# Patient Record
Sex: MALE | Race: WHITE | Employment: OTHER | URBAN - METROPOLITAN AREA
[De-identification: names, ages, dates, MRNs, and addresses within clinical notes are randomized per-mention and may not be internally consistent; named-entity substitution may affect disease eponyms.]

---

## 2018-10-16 ENCOUNTER — OFFICE VISIT (OUTPATIENT)
Dept: FAMILY MEDICINE CLINIC | Facility: CLINIC | Age: 48
End: 2018-10-16
Payer: COMMERCIAL

## 2018-10-16 VITALS
TEMPERATURE: 98 F | RESPIRATION RATE: 16 BRPM | BODY MASS INDEX: 23.92 KG/M2 | WEIGHT: 176.63 LBS | SYSTOLIC BLOOD PRESSURE: 122 MMHG | HEART RATE: 60 BPM | HEIGHT: 72 IN | DIASTOLIC BLOOD PRESSURE: 90 MMHG

## 2018-10-16 DIAGNOSIS — Z00.00 ROUTINE HEALTH MAINTENANCE: Primary | ICD-10-CM

## 2018-10-16 DIAGNOSIS — D22.5 ATYPICAL NEVUS OF BACK: ICD-10-CM

## 2018-10-16 DIAGNOSIS — L72.3 SEBACEOUS CYST: ICD-10-CM

## 2018-10-16 PROCEDURE — 99386 PREV VISIT NEW AGE 40-64: CPT | Performed by: FAMILY MEDICINE

## 2018-10-16 PROCEDURE — 90471 IMMUNIZATION ADMIN: CPT | Performed by: FAMILY MEDICINE

## 2018-10-16 PROCEDURE — 81003 URINALYSIS AUTO W/O SCOPE: CPT | Performed by: FAMILY MEDICINE

## 2018-10-16 PROCEDURE — 90715 TDAP VACCINE 7 YRS/> IM: CPT | Performed by: FAMILY MEDICINE

## 2018-10-16 NOTE — PROGRESS NOTES
Laurent Lebron is a 50year old male who presents for a complete physical exam.   HPI:   Pt complains of Nothing at this time.  He has not seen a doctor  Since HS, he swam in  and college,  And runs triathalons and marathons, and has not had any surger on his right flank that is dark  EYES:denies blurred vision or double vision  HEENT: denies nasal congestion, sinus pain or ST  LUNGS: denies shortness of breath with exertion  CARDIOVASCULAR: denies chest pain on exertion  GI: denies abdominal pain,denies CBC and TSH. Pt info handouts given for: exercise, low fat diet, testicular self exam and prostate cancer screening. The patient indicates understanding of these issues and agrees to the plan.   The patient is asked to return for CPX in 1 year  Routine hea

## 2018-10-22 ENCOUNTER — TELEPHONE (OUTPATIENT)
Dept: FAMILY MEDICINE CLINIC | Facility: CLINIC | Age: 48
End: 2018-10-22

## 2018-10-22 NOTE — TELEPHONE ENCOUNTER
----- Message from Sherryle Curb, DO sent at 10/22/2018 12:35 PM CDT -----  Notify Abisai Bain II  labs looked very good,  lipids were  Excellent kidney, liver function, blood sugar and thyroid were all normal. He's good for a year

## 2018-10-30 ENCOUNTER — OFFICE VISIT (OUTPATIENT)
Dept: SURGERY | Facility: CLINIC | Age: 48
End: 2018-10-30
Payer: COMMERCIAL

## 2018-10-30 VITALS — HEART RATE: 66 BPM | TEMPERATURE: 98 F | WEIGHT: 176 LBS | BODY MASS INDEX: 23.84 KG/M2 | HEIGHT: 72 IN

## 2018-10-30 DIAGNOSIS — L98.9 SKIN LESION: Primary | ICD-10-CM

## 2018-10-30 PROCEDURE — 11601 EXC TR-EXT MAL+MARG 0.6-1 CM: CPT | Performed by: SURGERY

## 2018-10-30 PROCEDURE — 11400 EXC TR-EXT B9+MARG 0.5 CM<: CPT | Performed by: SURGERY

## 2018-10-30 PROCEDURE — 88305 TISSUE EXAM BY PATHOLOGIST: CPT | Performed by: SURGERY

## 2018-10-30 PROCEDURE — 99242 OFF/OP CONSLTJ NEW/EST SF 20: CPT | Performed by: SURGERY

## 2018-10-30 NOTE — H&P
Amy Bah is a 50year old male  Patient presents with:  Cyst: New patient referred by Dr. Nova Leyden for nevus of lower back, right shoulder sebaceous cyst, lesion left chest and lump on left hand. Denies any pain.        REFERRED BY    Patient presents extremities  HEMATOLOGY: denies hx anemia; denies bruising or excessive bleeding  Endocrine: no weight gain or loss no hot or cold intolerance    EXAM     Pulse 66, temperature 97.7 °F (36.5 °C), temperature source Temporal, height 72\", weight 176 lb.   GE 124  <130 mg/dL (calc) Final    Comment: For patients with diabetes plus 1 major ASCVD risk   factor, treating to a non-HDL-C goal of <100 mg/dL   (LDL-C of <70 mg/dL) is considered a therapeutic   option.      • GLUCOSE 10/19/2018 86  65 - 99 mg/dL Final 0.40 - 4.50 mIU/L Final       ASSESSMENT   Imp: Suspicious skin lesions her clavicle and right hip  PLan: Excision in office    Procedure:  Both areas prepped draped sterile infiltrated with 1% Xylocaine with epinephrine by elliptical skin incision was mad

## 2018-11-02 NOTE — PROGRESS NOTES
Called , appt made for 11/8. Pt asking about pathology results and thinks it is bad. Did not share with patient.

## 2018-11-05 ENCOUNTER — TELEPHONE (OUTPATIENT)
Dept: SURGERY | Facility: CLINIC | Age: 48
End: 2018-11-05

## 2018-11-05 NOTE — TELEPHONE ENCOUNTER
Pt asking for test results. Dr. Bowen Humphries reviewed pathology with him. Pt has appt with Dr. Henny Mcrae in 2 days.

## 2018-11-08 ENCOUNTER — OFFICE VISIT (OUTPATIENT)
Dept: SURGERY | Facility: CLINIC | Age: 48
End: 2018-11-08
Payer: COMMERCIAL

## 2018-11-08 VITALS — SYSTOLIC BLOOD PRESSURE: 116 MMHG | HEART RATE: 70 BPM | OXYGEN SATURATION: 98 % | DIASTOLIC BLOOD PRESSURE: 78 MMHG

## 2018-11-08 DIAGNOSIS — C43.71: Primary | ICD-10-CM

## 2018-11-08 PROCEDURE — 99214 OFFICE O/P EST MOD 30 MIN: CPT | Performed by: SURGERY

## 2018-11-08 NOTE — PROGRESS NOTES
Follow Up Visit Note       Active Problems      No diagnosis found.       Chief Complaint   Patient presents with:  Melanoma: lesions removed on right hip and left clavicle, hip was melanoma        History of Present Illness  Patient presents with superfici Concerns:        Caffeine Concern: Not Asked        Exercise: Not Asked        Seat Belt: Not Asked        Special Diet: Not Asked        Stress Concern: Not Asked        Weight Concern: Not Asked    Social History Narrative      Not on file       No curr oriented x 3. Cranial nerves 2-12 grossly intact. No focal sensor or motor deficits. Psychiatric. Normal mood and appropriate affect. Skin.   Left supraclavicular skin biopsy site healing well suture removed right hip excision site healing well sutures

## 2018-11-14 ENCOUNTER — TELEPHONE (OUTPATIENT)
Dept: SURGERY | Facility: CLINIC | Age: 48
End: 2018-11-14

## 2018-11-14 ENCOUNTER — TELEPHONE (OUTPATIENT)
Dept: MAMMOGRAPHY | Age: 48
End: 2018-11-14

## 2018-11-14 DIAGNOSIS — C43.71: Primary | ICD-10-CM

## 2018-11-14 NOTE — TELEPHONE ENCOUNTER
LVM to call back Radiology RN, attempting to provide info on SLN mapping scheduled prior to OR on Monday 11/19/18.

## 2018-11-14 NOTE — TELEPHONE ENCOUNTER
Spoke with pt and discussed SLN mapping (right hip melanoma) scheduled for Monday before surgery, answered questions and provided emotional support. Pt states his surgery is scheduled for Friday 11/16/18, according to [facility] pt was rescheduled.  Dr Farrukh Barreto of

## 2018-11-15 ENCOUNTER — TELEPHONE (OUTPATIENT)
Dept: SURGERY | Facility: CLINIC | Age: 48
End: 2018-11-15

## 2018-11-19 ENCOUNTER — ANESTHESIA EVENT (OUTPATIENT)
Dept: SURGERY | Facility: HOSPITAL | Age: 48
End: 2018-11-19

## 2018-11-19 ENCOUNTER — HOSPITAL ENCOUNTER (OUTPATIENT)
Dept: NUCLEAR MEDICINE | Facility: HOSPITAL | Age: 48
Setting detail: HOSPITAL OUTPATIENT SURGERY
Discharge: HOME | End: 2018-11-19
Attending: SURGERY | Admitting: SURGERY
Payer: COMMERCIAL

## 2018-11-19 ENCOUNTER — HOSPITAL ENCOUNTER (OUTPATIENT)
Facility: HOSPITAL | Age: 48
Setting detail: HOSPITAL OUTPATIENT SURGERY
Discharge: HOME | End: 2018-11-19
Attending: SURGERY | Admitting: SURGERY
Payer: COMMERCIAL

## 2018-11-19 ENCOUNTER — ANESTHESIA (OUTPATIENT)
Dept: SURGERY | Facility: HOSPITAL | Age: 48
End: 2018-11-19

## 2018-11-19 VITALS
HEIGHT: 72 IN | OXYGEN SATURATION: 96 % | BODY MASS INDEX: 23.89 KG/M2 | DIASTOLIC BLOOD PRESSURE: 73 MMHG | HEART RATE: 56 BPM | RESPIRATION RATE: 16 BRPM | SYSTOLIC BLOOD PRESSURE: 109 MMHG | WEIGHT: 176.38 LBS | TEMPERATURE: 98 F

## 2018-11-19 DIAGNOSIS — C43.71: ICD-10-CM

## 2018-11-19 PROCEDURE — 07BC0ZX EXCISION OF PELVIS LYMPHATIC, OPEN APPROACH, DIAGNOSTIC: ICD-10-PCS | Performed by: SURGERY

## 2018-11-19 PROCEDURE — 78195 LYMPH SYSTEM IMAGING: CPT | Performed by: SURGERY

## 2018-11-19 PROCEDURE — 88305 TISSUE EXAM BY PATHOLOGIST: CPT | Performed by: SURGERY

## 2018-11-19 PROCEDURE — 88342 IMHCHEM/IMCYTCHM 1ST ANTB: CPT | Performed by: SURGERY

## 2018-11-19 PROCEDURE — 0JB80ZZ EXCISION OF ABDOMEN SUBCUTANEOUS TISSUE AND FASCIA, OPEN APPROACH: ICD-10-PCS | Performed by: SURGERY

## 2018-11-19 PROCEDURE — 88304 TISSUE EXAM BY PATHOLOGIST: CPT | Performed by: SURGERY

## 2018-11-19 PROCEDURE — 0JBF0ZZ EXCISION OF LEFT UPPER ARM SUBCUTANEOUS TISSUE AND FASCIA, OPEN APPROACH: ICD-10-PCS | Performed by: SURGERY

## 2018-11-19 PROCEDURE — 88307 TISSUE EXAM BY PATHOLOGIST: CPT | Performed by: SURGERY

## 2018-11-19 PROCEDURE — 88341 IMHCHEM/IMCYTCHM EA ADD ANTB: CPT | Performed by: SURGERY

## 2018-11-19 RX ORDER — CEFAZOLIN SODIUM/WATER 2 G/20 ML
SYRINGE (ML) INTRAVENOUS
Status: DISCONTINUED
Start: 2018-11-19 | End: 2018-11-19

## 2018-11-19 RX ORDER — NALOXONE HYDROCHLORIDE 0.4 MG/ML
80 INJECTION, SOLUTION INTRAMUSCULAR; INTRAVENOUS; SUBCUTANEOUS AS NEEDED
Status: DISCONTINUED | OUTPATIENT
Start: 2018-11-19 | End: 2018-11-19

## 2018-11-19 RX ORDER — METOCLOPRAMIDE HYDROCHLORIDE 5 MG/ML
10 INJECTION INTRAMUSCULAR; INTRAVENOUS AS NEEDED
Status: DISCONTINUED | OUTPATIENT
Start: 2018-11-19 | End: 2018-11-19

## 2018-11-19 RX ORDER — HYDROCODONE BITARTRATE AND ACETAMINOPHEN 5; 325 MG/1; MG/1
1 TABLET ORAL AS NEEDED
Status: DISCONTINUED | OUTPATIENT
Start: 2018-11-19 | End: 2018-11-19

## 2018-11-19 RX ORDER — ONDANSETRON 2 MG/ML
4 INJECTION INTRAMUSCULAR; INTRAVENOUS AS NEEDED
Status: DISCONTINUED | OUTPATIENT
Start: 2018-11-19 | End: 2018-11-19

## 2018-11-19 RX ORDER — LIDOCAINE AND PRILOCAINE 25; 25 MG/G; MG/G
CREAM TOPICAL ONCE
Status: COMPLETED | OUTPATIENT
Start: 2018-11-19 | End: 2018-11-19

## 2018-11-19 RX ORDER — SODIUM CHLORIDE, SODIUM LACTATE, POTASSIUM CHLORIDE, CALCIUM CHLORIDE 600; 310; 30; 20 MG/100ML; MG/100ML; MG/100ML; MG/100ML
INJECTION, SOLUTION INTRAVENOUS CONTINUOUS
Status: DISCONTINUED | OUTPATIENT
Start: 2018-11-19 | End: 2018-11-19

## 2018-11-19 RX ORDER — LABETALOL HYDROCHLORIDE 5 MG/ML
5 INJECTION, SOLUTION INTRAVENOUS EVERY 5 MIN PRN
Status: DISCONTINUED | OUTPATIENT
Start: 2018-11-19 | End: 2018-11-19

## 2018-11-19 RX ORDER — ACETAMINOPHEN 325 MG/1
650 TABLET ORAL ONCE
Status: DISCONTINUED | OUTPATIENT
Start: 2018-11-19 | End: 2018-11-19

## 2018-11-19 RX ORDER — DIAZEPAM 5 MG/1
5 TABLET ORAL AS NEEDED
Status: DISCONTINUED | OUTPATIENT
Start: 2018-11-19 | End: 2018-11-19 | Stop reason: HOSPADM

## 2018-11-19 RX ORDER — HEPARIN SODIUM 5000 [USP'U]/ML
5000 INJECTION, SOLUTION INTRAVENOUS; SUBCUTANEOUS ONCE
Status: COMPLETED | OUTPATIENT
Start: 2018-11-19 | End: 2018-11-19

## 2018-11-19 RX ORDER — HYDROCODONE BITARTRATE AND ACETAMINOPHEN 5; 325 MG/1; MG/1
1 TABLET ORAL EVERY 6 HOURS PRN
Qty: 20 TABLET | Refills: 0 | Status: SHIPPED | OUTPATIENT
Start: 2018-11-19 | End: 2018-11-27

## 2018-11-19 RX ORDER — HEPARIN SODIUM 5000 [USP'U]/ML
INJECTION, SOLUTION INTRAVENOUS; SUBCUTANEOUS
Status: COMPLETED
Start: 2018-11-19 | End: 2018-11-19

## 2018-11-19 RX ORDER — MORPHINE SULFATE 4 MG/ML
2 INJECTION, SOLUTION INTRAMUSCULAR; INTRAVENOUS EVERY 5 MIN PRN
Status: DISCONTINUED | OUTPATIENT
Start: 2018-11-19 | End: 2018-11-19

## 2018-11-19 RX ORDER — BUPIVACAINE HYDROCHLORIDE 5 MG/ML
INJECTION, SOLUTION EPIDURAL; INTRACAUDAL AS NEEDED
Status: DISCONTINUED | OUTPATIENT
Start: 2018-11-19 | End: 2018-11-19

## 2018-11-19 RX ORDER — HYDROCODONE BITARTRATE AND ACETAMINOPHEN 5; 325 MG/1; MG/1
2 TABLET ORAL AS NEEDED
Status: DISCONTINUED | OUTPATIENT
Start: 2018-11-19 | End: 2018-11-19

## 2018-11-19 RX ORDER — ACETAMINOPHEN 500 MG
1000 TABLET ORAL ONCE
Status: DISCONTINUED | OUTPATIENT
Start: 2018-11-19 | End: 2018-11-19 | Stop reason: HOSPADM

## 2018-11-19 RX ORDER — CEFAZOLIN SODIUM/WATER 2 G/20 ML
2 SYRINGE (ML) INTRAVENOUS ONCE
Status: COMPLETED | OUTPATIENT
Start: 2018-11-19 | End: 2018-11-19

## 2018-11-19 RX ORDER — ACETAMINOPHEN 500 MG
1000 TABLET ORAL ONCE
Status: ON HOLD | COMMUNITY
End: 2018-11-19

## 2018-11-19 RX ORDER — LIDOCAINE HYDROCHLORIDE AND EPINEPHRINE 10; 10 MG/ML; UG/ML
INJECTION, SOLUTION INFILTRATION; PERINEURAL AS NEEDED
Status: DISCONTINUED | OUTPATIENT
Start: 2018-11-19 | End: 2018-11-19

## 2018-11-19 RX ORDER — DIPHENHYDRAMINE HYDROCHLORIDE 50 MG/ML
12.5 INJECTION INTRAMUSCULAR; INTRAVENOUS AS NEEDED
Status: DISCONTINUED | OUTPATIENT
Start: 2018-11-19 | End: 2018-11-19

## 2018-11-19 NOTE — H&P
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   On further questioning patient does have a family history of melanoma in his father and sister        Allergies  Kaylah Rodriguez has No Known Allerg Weight Concern: Not Asked    Social History Narrative      Not on file        No current outpatient medications on file. Review of Systems  The Review of Systems has been reviewed by me during today.   Review of Systems   Constitutional: Negative fo skin biopsy site healing well suture removed right hip excision site healing well sutures removed  Lymphatic.   No cervical, supraclavicular, or inguinal lymphadenopathy.                 Assessment   All superficial spreading melanoma of the right anterior

## 2018-11-19 NOTE — ANESTHESIA PREPROCEDURE EVALUATION
PRE-OP EVALUATION    Patient Name: Reji Dodge II    Pre-op Diagnosis: [facility] [C43.71]    Procedure(s):  RADICAL EXCISION MALIGNANT MELANOMA RIGHT HIP WITH SENTINEL LYMPH NODE BIOPSY AND EXCISION RIGHT POSTERIOR SHOULDER SOFT TISSU Intravenous PRN   bupivacaine HCl (PF) (MARCAINE) 0.5 % injection   PRN   lidocaine-EPINEPHrine 1 %-1:519401 injection   PRN       Outpatient Medications:    HYDROcodone-acetaminophen (NORCO) 5-325 MG Oral Tab Take 1 tablet by mouth every 6 (six) hours as bilaterally. (-) rhonchi           Other findings            ASA: 1   Plan: general  NPO status verified and patient meets guidelines. Patient has not taken beta blockers in last 24 hours.         Plan/risks discussed with: patient and spouse

## 2018-11-19 NOTE — ANESTHESIA POSTPROCEDURE EVALUATION
[address] [facility] II Patient Status:  Hospital Outpatient Surgery   Age/Gender 50year old male MRN [id number]   [facility] SURGERY Attending Carlo Jennings, [address] [facility] Day # 0 PCP Loreto Westfall DO       Anesthesia Post-op Note

## 2018-11-20 NOTE — OPERATIVE REPORT
[address]    PATIENT'S NAME: Theo Fothergill II   ATTENDING PHYSICIAN: Hilda Childers D.O.   OPERATING PHYSICIAN: Hilda Childers D.O.   PATIENT ACCOUNT#:   [de-identified]    LOCATION:  [address] [place] [address]  MEDICAL RECORD #:   [id number]       D identified, were clipped, divided, and ligated. The blue-staining lymph node was found to have an in vivo count of approximately 13,000, and once excised had an ex vivo 10-second count of 14,000. The specimen was sent as sentinel lymph node #1.   Gamm fashion. Overlying sterile dressing was applied. Next, 10 mL of 0.5% Marcaine plain were injected into the malignant melanoma excision site. Sterile dressings were applied at all locations.   The patient was extubated in the operative suite and transport

## 2018-11-27 ENCOUNTER — OFFICE VISIT (OUTPATIENT)
Dept: SURGERY | Facility: CLINIC | Age: 48
End: 2018-11-27

## 2018-11-27 VITALS — HEIGHT: 72 IN | WEIGHT: 176 LBS | TEMPERATURE: 98 F | HEART RATE: 72 BPM | BODY MASS INDEX: 23.84 KG/M2

## 2018-11-27 DIAGNOSIS — C43.71: Primary | ICD-10-CM

## 2018-11-27 PROCEDURE — 99024 POSTOP FOLLOW-UP VISIT: CPT | Performed by: SURGERY

## 2018-11-28 NOTE — PROGRESS NOTES
[facility]  Progress Note    Merlin Kuster II Patient Status:  No patient class for patient encounter    1970 MRN [id number]   Location [facility] Attending No att. providers found   Hosp Day # 0 PCP Marques counseling the patient and/or on coordination of care. The diagnosis, prognosis, and general treatment was explained to the patient and the family.         DO KYLE Aquino General Surgery  11/28/2018  11:16 AM

## 2018-11-29 ENCOUNTER — PRIOR ORIGINAL RECORDS (OUTPATIENT)
Dept: OTHER | Age: 48
End: 2018-11-29

## 2018-12-07 ENCOUNTER — MED REC SCAN ONLY (OUTPATIENT)
Dept: FAMILY MEDICINE CLINIC | Facility: CLINIC | Age: 48
End: 2018-12-07

## 2018-12-11 ENCOUNTER — OFFICE VISIT (OUTPATIENT)
Dept: SURGERY | Facility: CLINIC | Age: 48
End: 2018-12-11

## 2018-12-11 VITALS — HEIGHT: 72 IN | WEIGHT: 176 LBS | HEART RATE: 70 BPM | TEMPERATURE: 98 F | BODY MASS INDEX: 23.84 KG/M2

## 2018-12-11 DIAGNOSIS — C43.71: Primary | ICD-10-CM

## 2018-12-11 PROCEDURE — 99024 POSTOP FOLLOW-UP VISIT: CPT | Performed by: SURGERY

## 2018-12-18 NOTE — PROGRESS NOTES
Patient presents to evaluate suture emanating from incision.   Monocryl emanating from end of wound excised

## 2019-01-10 ENCOUNTER — MED REC SCAN ONLY (OUTPATIENT)
Dept: FAMILY MEDICINE CLINIC | Facility: CLINIC | Age: 49
End: 2019-01-10

## 2019-04-29 ENCOUNTER — MED REC SCAN ONLY (OUTPATIENT)
Dept: FAMILY MEDICINE CLINIC | Facility: CLINIC | Age: 49
End: 2019-04-29

## 2019-07-09 ENCOUNTER — MED REC SCAN ONLY (OUTPATIENT)
Dept: FAMILY MEDICINE CLINIC | Facility: CLINIC | Age: 49
End: 2019-07-09

## 2020-01-11 ENCOUNTER — MED REC SCAN ONLY (OUTPATIENT)
Dept: FAMILY MEDICINE CLINIC | Facility: CLINIC | Age: 50
End: 2020-01-11

## 2020-07-13 ENCOUNTER — MED REC SCAN ONLY (OUTPATIENT)
Dept: FAMILY MEDICINE CLINIC | Facility: CLINIC | Age: 50
End: 2020-07-13

## 2020-10-09 LAB
ALBUMIN/GLOB SERPL: 1.5 (CALC) (ref 1–2.5)
ALBUMIN: 4.2 G/DL (ref 3.6–5.1)
ALKALINE PHOSPHATASE: 40 UNIT/L (ref 40–115)
ALT: 24 UNIT/L (ref 9–46)
AST: 14 UNIT/L (ref 10–40)
BASO%: 0.8 %
BASO: 0.1 10^3/UL
BILIRUBIN, TOTAL: 0.9 MG/DL (ref 0.2–1.2)
BUN/CREATININE RATIO: NORMAL (CALC) (ref 6–22)
CALCIUM: 9.1 MG/DL (ref 8.6–10.3)
CARBON DIOXIDE: 20 MMOL/L (ref 20–32)
CHLORIDE: 106 MMOL/L (ref 98–110)
CRCL (C&G) (MOSAIQ HL): 111.46 ML/MIN
CREATININE CLEARANCE (MOSAIQ HL): 83.1 ML/MIN
CREATININE: 0.91 MG/DL (ref 0.6–1.35)
EGFR AFRICAN AMERICAN: 115 ML/MIN/1.73M2
EGFR NON-AFR. AMERICAN: 99 ML/MIN/1.73M2
EOS%: 1.6 %
EOS: 0.1 10^3/UL
GLOBULIN: 2.8 G/DL (CALC) (ref 1.9–3.7)
GLUCOSE: 87 MG/DL (ref 65–99)
HCT: 44.5 % (ref 38–54)
HGB: 15.3 G/DL (ref 12–18)
LYMPH%: 27.5 % (ref 12–44)
LYMPH: 1.7 10^3/UL (ref 0.8–2.8)
MCH: 30.9 PG (ref 26–33)
MCHC: 34.4 G/DL (ref 31–36)
MCV: 89.9 FML (ref 82–100)
MONO%: 11.7 % (ref 2–12)
MONO: 0.7 10^3/UL (ref 0.2–1)
MPV: 9.9 FML (ref 8.6–11.7)
NEUT%: 58.4 % (ref 47–76)
NEUT: 3.6 10^3/UL (ref 1.5–7.1)
PLT: 248 10^3/UL (ref 150–375)
POTASSIUM: 4 MMOL/L (ref 3.5–5.3)
PROTEIN, TOTAL: 7 G/DL (ref 6.1–8.1)
RBC: 4.95 10^6/UL (ref 4.2–6.2)
RDW-CV: 12.5 %
RDW-SD: 40.4 FML (ref 36–50)
SODIUM: 139 MMOL/L (ref 135–146)
UREA NITROGEN (BUN): 12 MG/DL (ref 7–25)
WBC: 6.2 10^3/UL (ref 4.3–11)

## 2020-10-11 VITALS
WEIGHT: 175 LBS | DIASTOLIC BLOOD PRESSURE: 76 MMHG | HEIGHT: 72 IN | SYSTOLIC BLOOD PRESSURE: 116 MMHG | BODY MASS INDEX: 23.7 KG/M2

## 2020-12-13 ENCOUNTER — APPOINTMENT (OUTPATIENT)
Dept: GENERAL RADIOLOGY | Facility: HOSPITAL | Age: 50
End: 2020-12-13
Attending: EMERGENCY MEDICINE
Payer: COMMERCIAL

## 2020-12-13 ENCOUNTER — HOSPITAL ENCOUNTER (EMERGENCY)
Facility: HOSPITAL | Age: 50
Discharge: HOME | End: 2020-12-13
Attending: EMERGENCY MEDICINE
Payer: COMMERCIAL

## 2020-12-13 VITALS
DIASTOLIC BLOOD PRESSURE: 84 MMHG | SYSTOLIC BLOOD PRESSURE: 117 MMHG | RESPIRATION RATE: 18 BRPM | OXYGEN SATURATION: 97 % | TEMPERATURE: 99 F | HEART RATE: 70 BPM | BODY MASS INDEX: 23.7 KG/M2 | WEIGHT: 175 LBS | HEIGHT: 72 IN

## 2020-12-13 DIAGNOSIS — S69.92XA INJURY OF FINGER OF LEFT HAND, INITIAL ENCOUNTER: Primary | ICD-10-CM

## 2020-12-13 DIAGNOSIS — T14.8XXA AVULSION FRACTURE: ICD-10-CM

## 2020-12-13 PROCEDURE — 99284 EMERGENCY DEPT VISIT MOD MDM: CPT

## 2020-12-13 PROCEDURE — 64450 NJX AA&/STRD OTHER PN/BRANCH: CPT

## 2020-12-13 PROCEDURE — 99283 EMERGENCY DEPT VISIT LOW MDM: CPT

## 2020-12-13 PROCEDURE — 90471 IMMUNIZATION ADMIN: CPT

## 2020-12-13 PROCEDURE — 73140 X-RAY EXAM OF FINGER(S): CPT | Performed by: EMERGENCY MEDICINE

## 2020-12-13 RX ORDER — CEPHALEXIN 500 MG/1
500 CAPSULE ORAL 3 TIMES DAILY
Qty: 15 CAPSULE | Refills: 0 | Status: SHIPPED | OUTPATIENT
Start: 2020-12-13 | End: 2020-12-18

## 2020-12-13 NOTE — ED NOTES
Asepsis in progress, bleeding well controlled. Patient resting comfortably in the stretcher with family at bedside.

## 2020-12-13 NOTE — ED NOTES
Tube gauze dressing applied. Bleeding controlled and no signs of bleeding through the dressing. Patient provided with wound care supplies and instructions for use prior to discharge.

## 2020-12-14 NOTE — ED PROVIDER NOTES
Patient Seen in: [facility] Emergency Department      History   Patient presents with:  Laceration/Abrasion    Stated Complaint: cut finger with tablesaw    HPI    Patient is a pleasant 49-year-old male presents with a table saw injury to his left r tested and intact. ED Course   Labs Reviewed - No data to display       Left fourth finger: Osseous avulsion fracture from the ulnar cortical aspect of the fourth distal phalanx with overlying soft tissue swelling and laceration.   Report reviewed, x-ray

## 2020-12-17 DIAGNOSIS — Z12.11 ENCOUNTER FOR SCREENING COLONOSCOPY: Primary | ICD-10-CM

## 2021-01-01 ENCOUNTER — EXTERNAL RECORD (OUTPATIENT)
Dept: INFUSION THERAPY | Age: 51
End: 2021-01-01

## 2021-01-06 ENCOUNTER — MED REC SCAN ONLY (OUTPATIENT)
Dept: FAMILY MEDICINE CLINIC | Facility: CLINIC | Age: 51
End: 2021-01-06

## 2021-04-24 ENCOUNTER — HOSPITAL ENCOUNTER (OUTPATIENT)
Age: 51
Discharge: OTHER INSTITUTION | End: 2021-04-24
Payer: COMMERCIAL

## 2021-04-24 VITALS
HEART RATE: 66 BPM | OXYGEN SATURATION: 96 % | HEIGHT: 72 IN | WEIGHT: 178 LBS | TEMPERATURE: 98 F | RESPIRATION RATE: 16 BRPM | BODY MASS INDEX: 24.11 KG/M2

## 2021-04-24 DIAGNOSIS — M54.59 INTRACTABLE LOW BACK PAIN: Primary | ICD-10-CM

## 2021-04-24 PROCEDURE — 96372 THER/PROPH/DIAG INJ SC/IM: CPT | Performed by: PHYSICIAN ASSISTANT

## 2021-04-24 PROCEDURE — 99214 OFFICE O/P EST MOD 30 MIN: CPT | Performed by: PHYSICIAN ASSISTANT

## 2021-04-24 RX ORDER — HYDROCODONE BITARTRATE AND ACETAMINOPHEN 5; 325 MG/1; MG/1
2 TABLET ORAL ONCE
Status: COMPLETED | OUTPATIENT
Start: 2021-04-24 | End: 2021-04-24

## 2021-04-24 RX ORDER — KETOROLAC TROMETHAMINE 30 MG/ML
60 INJECTION, SOLUTION INTRAMUSCULAR; INTRAVENOUS ONCE
Status: COMPLETED | OUTPATIENT
Start: 2021-04-24 | End: 2021-04-24

## 2021-04-24 RX ORDER — DIAZEPAM 5 MG/1
5 TABLET ORAL ONCE
Status: COMPLETED | OUTPATIENT
Start: 2021-04-24 | End: 2021-04-24

## 2021-04-24 NOTE — ED INITIAL ASSESSMENT (HPI)
x2 days Pt was bending over and \"felt a lighting bolt in my lower back\"     4/24 Pt was running up stairs and c/o left lower back pain, Pt in wheelchair, leaning on his side and unable to move.

## 2021-04-24 NOTE — ED PROVIDER NOTES
Patient Seen in: Immediate [address] [place] [address]      History   Patient presents with:  Back Pain    Stated Complaint: back pain    HPI/Subjective:   HPI    20-year-old gentleman. Arrives to the immediate care with his wife for onset of acute low back pain.   He e intact. Strong pulses. Back: Patient slightly bent at the waist and turned on the side. Unable to fully straighten. Unable to ambulate. ED Course   Labs Reviewed - No data to display       MDM      No hypotension.   Sensation to the lower extremiti

## 2021-04-26 ENCOUNTER — OFFICE VISIT (OUTPATIENT)
Dept: FAMILY MEDICINE CLINIC | Facility: CLINIC | Age: 51
End: 2021-04-26
Payer: COMMERCIAL

## 2021-04-26 VITALS
HEIGHT: 72 IN | TEMPERATURE: 99 F | DIASTOLIC BLOOD PRESSURE: 76 MMHG | SYSTOLIC BLOOD PRESSURE: 128 MMHG | HEART RATE: 86 BPM | WEIGHT: 187.38 LBS | RESPIRATION RATE: 16 BRPM | BODY MASS INDEX: 25.38 KG/M2 | OXYGEN SATURATION: 99 %

## 2021-04-26 DIAGNOSIS — R29.898 WEAKNESS OF LEFT LOWER EXTREMITY: ICD-10-CM

## 2021-04-26 DIAGNOSIS — M54.42 ACUTE BILATERAL LOW BACK PAIN WITH LEFT-SIDED SCIATICA: Primary | ICD-10-CM

## 2021-04-26 DIAGNOSIS — M48.061 SPINAL STENOSIS OF LUMBAR REGION AT MULTIPLE LEVELS: ICD-10-CM

## 2021-04-26 DIAGNOSIS — M51.36 DEGENERATIVE DISC DISEASE, LUMBAR: ICD-10-CM

## 2021-04-26 PROBLEM — C43.9 CUTANEOUS MALIGNANT MELANOMA (HCC): Status: ACTIVE | Noted: 2019-01-08

## 2021-04-26 PROCEDURE — 99214 OFFICE O/P EST MOD 30 MIN: CPT | Performed by: FAMILY MEDICINE

## 2021-04-26 PROCEDURE — 3008F BODY MASS INDEX DOCD: CPT | Performed by: FAMILY MEDICINE

## 2021-04-26 PROCEDURE — 3078F DIAST BP <80 MM HG: CPT | Performed by: FAMILY MEDICINE

## 2021-04-26 PROCEDURE — 3074F SYST BP LT 130 MM HG: CPT | Performed by: FAMILY MEDICINE

## 2021-04-26 RX ORDER — PREDNISONE 10 MG/1
TABLET ORAL
Qty: 18 TABLET | Refills: 0 | Status: SHIPPED | OUTPATIENT
Start: 2021-04-26

## 2021-04-26 RX ORDER — CYCLOBENZAPRINE HCL 5 MG
5 TABLET ORAL 3 TIMES DAILY PRN
COMMUNITY
Start: 2021-04-25

## 2021-04-26 RX ORDER — NAPROXEN 250 MG/1
500 TABLET ORAL 2 TIMES DAILY
COMMUNITY
Start: 2021-04-25

## 2021-04-26 RX ORDER — LIDOCAINE 50 MG/G
1 PATCH TOPICAL DAILY
COMMUNITY
Start: 2021-04-26

## 2021-04-26 NOTE — PROGRESS NOTES
Tamir Lucero is a 48year old male. HPI:   Jesus Islas was admitted to [facility] after he experienced acute onset of low back pain and sharp stabbing pain in his lower back, after going up the stairs at home.  He had spasms of his lower back for 2 hours, and then (37 °C) (Temporal)   Resp 16   Ht 6' (1.829 m)   Wt 187 lb 6 oz (85 kg)   SpO2 99%   BMI 25.41 kg/m²   GENERAL: well developed, well nourished,in no apparent distress  SKIN: no rashes,no suspicious lesions  HEENT: atraumatic, normocephalic,ears and throat

## 2021-05-04 ENCOUNTER — TELEPHONE (OUTPATIENT)
Dept: FAMILY MEDICINE CLINIC | Facility: CLINIC | Age: 51
End: 2021-05-04

## 2021-05-04 NOTE — TELEPHONE ENCOUNTER
MRI Denial received today    RN contacted [facility] [phone] for PTP discussion  CT [id number] MRI of Lumbar Spine    RN spoke with nurse reviewer to set up PTP-- she advised that provider does not need PTP appointment- Dr. Nicolas Cuello is to call number listed above.

## 2021-05-04 NOTE — TELEPHONE ENCOUNTER
Dr. Migue Berrios proivded RN with Augustus Rodriguez # from speaking with [facility] Physician reviewer    Auth # [id number]  Approved through 5/26/21    Information sent via message to Jacquie Arrington in [place] [employer]    Future Appointments   Date Time Provider Phan Laguna

## 2021-05-05 ENCOUNTER — HOSPITAL ENCOUNTER (OUTPATIENT)
Dept: MRI IMAGING | Age: 51
Discharge: HOME | End: 2021-05-05
Attending: FAMILY MEDICINE
Payer: COMMERCIAL

## 2021-05-05 DIAGNOSIS — M51.36 DEGENERATIVE DISC DISEASE, LUMBAR: ICD-10-CM

## 2021-05-05 DIAGNOSIS — M54.42 ACUTE BILATERAL LOW BACK PAIN WITH LEFT-SIDED SCIATICA: Primary | ICD-10-CM

## 2021-05-05 DIAGNOSIS — M54.42 ACUTE BILATERAL LOW BACK PAIN WITH LEFT-SIDED SCIATICA: ICD-10-CM

## 2021-05-05 DIAGNOSIS — M48.061 SPINAL STENOSIS OF LUMBAR REGION AT MULTIPLE LEVELS: ICD-10-CM

## 2021-05-05 PROCEDURE — 72148 MRI LUMBAR SPINE W/O DYE: CPT | Performed by: FAMILY MEDICINE

## 2021-07-27 ENCOUNTER — TELEPHONE (OUTPATIENT)
Dept: FAMILY MEDICINE CLINIC | Facility: CLINIC | Age: 51
End: 2021-07-27

## 2021-08-11 ENCOUNTER — OFFICE VISIT (OUTPATIENT)
Dept: SURGERY | Facility: CLINIC | Age: 51
End: 2021-08-11
Payer: COMMERCIAL

## 2021-08-11 VITALS
WEIGHT: 174 LBS | HEIGHT: 72 IN | DIASTOLIC BLOOD PRESSURE: 75 MMHG | SYSTOLIC BLOOD PRESSURE: 125 MMHG | TEMPERATURE: 98 F | HEART RATE: 61 BPM | BODY MASS INDEX: 23.57 KG/M2

## 2021-08-11 DIAGNOSIS — Z12.11 SCREEN FOR COLON CANCER: Primary | ICD-10-CM

## 2021-08-11 PROCEDURE — S0285 CNSLT BEFORE SCREEN COLONOSC: HCPCS | Performed by: SURGERY

## 2021-08-11 PROCEDURE — 3074F SYST BP LT 130 MM HG: CPT | Performed by: SURGERY

## 2021-08-11 PROCEDURE — 3078F DIAST BP <80 MM HG: CPT | Performed by: SURGERY

## 2021-08-11 PROCEDURE — 3008F BODY MASS INDEX DOCD: CPT | Performed by: SURGERY

## 2021-08-11 RX ORDER — POLYETHYLENE GLYCOL 3350, SODIUM CHLORIDE, SODIUM BICARBONATE, POTASSIUM CHLORIDE 420; 11.2; 5.72; 1.48 G/4L; G/4L; G/4L; G/4L
POWDER, FOR SOLUTION ORAL
Qty: 1 EACH | Refills: 0 | Status: SHIPPED | OUTPATIENT
Start: 2021-08-11

## 2021-08-11 NOTE — H&P
New Patient Visit Note       Active Problems      No diagnosis found. Chief Complaint   Patient presents with:  Colonoscopy: EST/NP - Colonoscopy consult. PT has not had a cscope before. PT denies fx of colon/rectal cancer.  PT denies abd. pain, bloating Take 5 mg by mouth 3 (three) times daily as needed. lidocaine 5 % External Patch, Apply 1 patch topically daily. naproxen 250 MG Oral Tab, Take 500 mg by mouth 2 (two) times daily.  (Patient not taking: Reported on 4/26/2021 )  predniSONE 10 MG Oral Tab, soft.      Tenderness: There is no abdominal tenderness. There is no rebound. Skin:     General: Skin is warm and dry. Neurological:      Mental Status: He is alert and oriented to person, place, and time.    Psychiatric:         Behavior: Behavior norm

## 2021-08-26 ENCOUNTER — MED REC SCAN ONLY (OUTPATIENT)
Dept: SURGERY | Facility: CLINIC | Age: 51
End: 2021-08-26

## 2021-09-09 ENCOUNTER — PATIENT OUTREACH (OUTPATIENT)
Dept: SURGERY | Facility: CLINIC | Age: 51
End: 2021-09-09

## 2022-01-12 ENCOUNTER — MED REC SCAN ONLY (OUTPATIENT)
Dept: FAMILY MEDICINE CLINIC | Facility: CLINIC | Age: 52
End: 2022-01-12

## 2024-01-15 ENCOUNTER — MED REC SCAN ONLY (OUTPATIENT)
Dept: FAMILY MEDICINE CLINIC | Facility: CLINIC | Age: 54
End: 2024-01-15

## 2025-01-21 ENCOUNTER — MED REC SCAN ONLY (OUTPATIENT)
Dept: FAMILY MEDICINE CLINIC | Facility: CLINIC | Age: 55
End: 2025-01-21

## 2025-04-16 ENCOUNTER — TELEPHONE (OUTPATIENT)
Dept: SURGERY | Facility: CLINIC | Age: 55
End: 2025-04-16

## 2025-04-16 ENCOUNTER — APPOINTMENT (OUTPATIENT)
Dept: CT IMAGING | Facility: HOSPITAL | Age: 55
End: 2025-04-16
Attending: EMERGENCY MEDICINE
Payer: COMMERCIAL

## 2025-04-16 ENCOUNTER — HOSPITAL ENCOUNTER (EMERGENCY)
Facility: HOSPITAL | Age: 55
Discharge: HOME | End: 2025-04-16
Attending: EMERGENCY MEDICINE
Payer: COMMERCIAL

## 2025-04-16 ENCOUNTER — OFFICE VISIT (OUTPATIENT)
Facility: LOCATION | Age: 55
End: 2025-04-16
Payer: COMMERCIAL

## 2025-04-16 VITALS
WEIGHT: 180 LBS | HEART RATE: 57 BPM | TEMPERATURE: 98 F | HEIGHT: 72 IN | DIASTOLIC BLOOD PRESSURE: 74 MMHG | SYSTOLIC BLOOD PRESSURE: 129 MMHG | RESPIRATION RATE: 13 BRPM | OXYGEN SATURATION: 96 % | BODY MASS INDEX: 24.38 KG/M2

## 2025-04-16 DIAGNOSIS — N13.2 URETERAL STONE WITH HYDRONEPHROSIS: Primary | ICD-10-CM

## 2025-04-16 DIAGNOSIS — N20.0 KIDNEY STONE: Primary | ICD-10-CM

## 2025-04-16 DIAGNOSIS — N20.1 URETERAL STONE: Primary | ICD-10-CM

## 2025-04-16 DIAGNOSIS — K76.9 HEPATIC LESION: ICD-10-CM

## 2025-04-16 DIAGNOSIS — R10.12 ACUTE LUQ PAIN: ICD-10-CM

## 2025-04-16 DIAGNOSIS — N23 RENAL COLIC: ICD-10-CM

## 2025-04-16 LAB
ALBUMIN SERPL-MCNC: 4.3 G/DL (ref 3.2–4.8)
ALBUMIN/GLOB SERPL: 1.5 {RATIO} (ref 1–2)
ALP LIVER SERPL-CCNC: 46 U/L (ref 45–117)
ALT SERPL-CCNC: 17 U/L (ref 10–49)
ANION GAP SERPL CALC-SCNC: 11 MMOL/L (ref 0–18)
AST SERPL-CCNC: 17 U/L (ref ?–34)
BASOPHILS # BLD AUTO: 0.05 X10(3) UL (ref 0–0.2)
BASOPHILS NFR BLD AUTO: 1 %
BILIRUB SERPL-MCNC: 0.9 MG/DL (ref 0.3–1.2)
BILIRUB UR QL STRIP.AUTO: NEGATIVE
BUN BLD-MCNC: 16 MG/DL (ref 9–23)
CALCIUM BLD-MCNC: 9.4 MG/DL (ref 8.7–10.6)
CHLORIDE SERPL-SCNC: 106 MMOL/L (ref 98–112)
CO2 SERPL-SCNC: 21 MMOL/L (ref 21–32)
COLOR UR AUTO: YELLOW
CREAT BLD-MCNC: 1.17 MG/DL (ref 0.7–1.3)
EGFRCR SERPLBLD CKD-EPI 2021: 74 ML/MIN/1.73M2 (ref 60–?)
EOSINOPHIL # BLD AUTO: 0.12 X10(3) UL (ref 0–0.7)
EOSINOPHIL NFR BLD AUTO: 2.3 %
ERYTHROCYTE [DISTWIDTH] IN BLOOD BY AUTOMATED COUNT: 11.9 %
GLOBULIN PLAS-MCNC: 2.8 G/DL (ref 2–3.5)
GLUCOSE BLD-MCNC: 130 MG/DL (ref 70–99)
GLUCOSE UR STRIP.AUTO-MCNC: NORMAL MG/DL
HCT VFR BLD AUTO: 43.3 % (ref 39–53)
HGB BLD-MCNC: 15.7 G/DL (ref 13–17.5)
IMM GRANULOCYTES # BLD AUTO: 0.02 X10(3) UL (ref 0–1)
IMM GRANULOCYTES NFR BLD: 0.4 %
LEUKOCYTE ESTERASE UR QL STRIP.AUTO: NEGATIVE
LIPASE SERPL-CCNC: 55 U/L (ref 12–53)
LYMPHOCYTES # BLD AUTO: 2 X10(3) UL (ref 1–4)
LYMPHOCYTES NFR BLD AUTO: 39.1 %
MCH RBC QN AUTO: 31.6 PG (ref 26–34)
MCHC RBC AUTO-ENTMCNC: 36.3 G/DL (ref 31–37)
MCV RBC AUTO: 87.1 FL (ref 80–100)
MONOCYTES # BLD AUTO: 0.58 X10(3) UL (ref 0.1–1)
MONOCYTES NFR BLD AUTO: 11.3 %
NEUTROPHILS # BLD AUTO: 2.35 X10 (3) UL (ref 1.5–7.7)
NEUTROPHILS # BLD AUTO: 2.35 X10(3) UL (ref 1.5–7.7)
NEUTROPHILS NFR BLD AUTO: 45.9 %
NITRITE UR QL STRIP.AUTO: NEGATIVE
OSMOLALITY SERPL CALC.SUM OF ELEC: 289 MOSM/KG (ref 275–295)
PH UR STRIP.AUTO: 6.5 [PH] (ref 5–8)
PLATELET # BLD AUTO: 244 10(3)UL (ref 150–450)
POTASSIUM SERPL-SCNC: 4 MMOL/L (ref 3.5–5.1)
PROT SERPL-MCNC: 7.1 G/DL (ref 5.7–8.2)
PROT UR STRIP.AUTO-MCNC: 30 MG/DL
RBC # BLD AUTO: 4.97 X10(6)UL (ref 4.3–5.7)
RBC #/AREA URNS AUTO: >10 /HPF
SODIUM SERPL-SCNC: 138 MMOL/L (ref 136–145)
SP GR UR STRIP.AUTO: 1.03 (ref 1–1.03)
UROBILINOGEN UR STRIP.AUTO-MCNC: NORMAL MG/DL
WBC # BLD AUTO: 5.1 X10(3) UL (ref 4–11)

## 2025-04-16 PROCEDURE — 74176 CT ABD & PELVIS W/O CONTRAST: CPT | Performed by: EMERGENCY MEDICINE

## 2025-04-16 PROCEDURE — 83690 ASSAY OF LIPASE: CPT | Performed by: EMERGENCY MEDICINE

## 2025-04-16 PROCEDURE — 80053 COMPREHEN METABOLIC PANEL: CPT | Performed by: EMERGENCY MEDICINE

## 2025-04-16 PROCEDURE — 85025 COMPLETE CBC W/AUTO DIFF WBC: CPT | Performed by: EMERGENCY MEDICINE

## 2025-04-16 PROCEDURE — 96361 HYDRATE IV INFUSION ADD-ON: CPT

## 2025-04-16 PROCEDURE — 99285 EMERGENCY DEPT VISIT HI MDM: CPT

## 2025-04-16 PROCEDURE — 99284 EMERGENCY DEPT VISIT MOD MDM: CPT

## 2025-04-16 PROCEDURE — 96374 THER/PROPH/DIAG INJ IV PUSH: CPT

## 2025-04-16 PROCEDURE — 81001 URINALYSIS AUTO W/SCOPE: CPT | Performed by: EMERGENCY MEDICINE

## 2025-04-16 PROCEDURE — 96375 TX/PRO/DX INJ NEW DRUG ADDON: CPT

## 2025-04-16 PROCEDURE — 99204 OFFICE O/P NEW MOD 45 MIN: CPT

## 2025-04-16 PROCEDURE — 96376 TX/PRO/DX INJ SAME DRUG ADON: CPT

## 2025-04-16 RX ORDER — HYDROCODONE BITARTRATE AND ACETAMINOPHEN 5; 325 MG/1; MG/1
1-2 TABLET ORAL EVERY 6 HOURS PRN
Qty: 10 TABLET | Refills: 0 | Status: ON HOLD | OUTPATIENT
Start: 2025-04-16 | End: 2025-04-18

## 2025-04-16 RX ORDER — MORPHINE SULFATE 4 MG/ML
4 INJECTION, SOLUTION INTRAMUSCULAR; INTRAVENOUS EVERY 30 MIN PRN
Status: DISCONTINUED | OUTPATIENT
Start: 2025-04-16 | End: 2025-04-16

## 2025-04-16 RX ORDER — VALACYCLOVIR HYDROCHLORIDE 1 G/1
TABLET, FILM COATED ORAL
COMMUNITY
Start: 2025-01-23 | End: 2025-04-17 | Stop reason: CLARIF

## 2025-04-16 RX ORDER — TAMSULOSIN HYDROCHLORIDE 0.4 MG/1
0.4 CAPSULE ORAL
Qty: 30 CAPSULE | Refills: 0 | Status: SHIPPED | OUTPATIENT
Start: 2025-04-16 | End: 2025-05-16

## 2025-04-16 RX ORDER — ONDANSETRON 4 MG/1
4 TABLET, ORALLY DISINTEGRATING ORAL EVERY 4 HOURS PRN
Qty: 10 TABLET | Refills: 0 | Status: SHIPPED | OUTPATIENT
Start: 2025-04-16 | End: 2025-04-23

## 2025-04-16 RX ORDER — ONDANSETRON 2 MG/ML
4 INJECTION INTRAMUSCULAR; INTRAVENOUS ONCE
Status: COMPLETED | OUTPATIENT
Start: 2025-04-16 | End: 2025-04-16

## 2025-04-16 RX ORDER — KETOROLAC TROMETHAMINE 15 MG/ML
15 INJECTION, SOLUTION INTRAMUSCULAR; INTRAVENOUS ONCE
Status: COMPLETED | OUTPATIENT
Start: 2025-04-16 | End: 2025-04-16

## 2025-04-16 RX ORDER — SODIUM CHLORIDE 9 MG/ML
INJECTION, SOLUTION INTRAVENOUS CONTINUOUS
Status: DISCONTINUED | OUTPATIENT
Start: 2025-04-16 | End: 2025-04-16

## 2025-04-16 NOTE — PROGRESS NOTES
HPI:     Aquilino Lynn II is a 54 year old male with hx of melanoma, who presents to the office today for kidney stones consultation.    PCP: Dr. Yobani Dietz    Patient complains of Left sided flank pain -- started initially ~2 days ago, LUQ, mild pressure-like pain. Resolved with rubbing area. Then earlier this morning, LUQ returned but more severe, went to ER for acute onset Left flank pain with associated N/V. Per encounter note, no c/o F/C/D or other exacerbating or relieving factors. Workup as below. Discharged home with Norco and Zofran; advised close urology follow up.     Imaging performed: CT A/P : 9mm obstructing stone proximal Left ureter resulting in mild Left hydronephrosis and mild Left perinephric stranding. Urinary bladder wall thickening with fatty induration. Prostate calcifications.     Labs: 04/16/25  -Serum creatinine: 1.17  -Serum calcium: 9.4  - WBC: 5.1  Urinalysis:  -Urine pH 6.5  -Blood 3+ / >10 RBC/hpf  -Leuks negative    There are no exacerbating factors.    Symptoms   Currently LUQ pain 1/10 on numeric pain scale -- last dose of Norco at 1200 today. Spouse also reports pt took some ibuprofen this afternoon prior to running errands.   reports nausea and vomiting.  denies fever.   denies frequency.  reports taking analgesics  denies taking flomax     Fluid intake: denies recent decrease in water/fluid intake  Dietary salt: denies excessive intake  History of topamax use: NONE  Excessive Vit C consumption: NONE  Hx of recurrent UTI: NONE  Hx of gout: NONE    Previous stone disease: YES ~20 years ago, passed spontaneously  Previous stone treatments:  NONE    Fhx of stones/ malignancy: NONE    AUA SS 0/35.  QOL: (not answered on questionnaire form)    HISTORY:  Past Medical History[1]   Past Surgical History[2]   Family History[3]   Social History: Short Social Hx on File[4]     Medications (Active prior to today's visit):  Current Medications[5]    Allergies:  Allergies[6]    ROS:     A  comprehensive 10 point review of systems was completed.  Pertinent positives and negatives noted in the the HPI.    PHYSICAL EXAM:     GENERAL APPEARANCE: well, developed, well nourished. Patient appears uncomfortable at times, at one point pt requesting to lay supine on exam table due to light headedness.  NEUROLOGIC: nonfocal, alert and oriented  HEAD: normocephalic, atraumatic  EYES: sclera non-icteric  EARS: hearing intact  ORAL CAVITY: mucosa moist  NECK/THYROID: no obvious goiter or masses  LUNGS: nonlabored breathing  ABDOMEN: soft, no obvious masses. Some LUQ tenderness. No CVAT.   SKIN: no obvious rashes     ASSESSMENT/PLAN:   Ureteral stone  Hydronephrosis  LUQ pain  Distant hx of kidney stones, last episode ~20 yrs ago, was able to pass stone w/o surgical interventions.   LUQ pain started 2 days ago, pressure-like. Acute onset/return of pain earlier this morning, prompting visit to ER. 9mm obstructing stone proximal Left ureter with mild Left hydronephrosis.   -  Different stone management options were discussed including watchful waiting, medical expulsive therapy, ESWL with or without pre-stenting, ureteroscopy with laser lithotripsy, and percutaneous nephrolithotomy.    Rationale and approach as well as benefits, risks, possible complications and reasonable alternatives of each treatment were discussed with the patient.  Stone clearance rates were discussed as well as the expected postoperative course of recovery.   We discussed the eventual need for stent removal which is usually performed in the office setting. .  We discussed risks of surgery including but not limited to medical and anesthetic complications, bleeding, infection, damage to surrounding organs or structures, ureteral injury, stricture formation, and need for additional procedures.   Patient decided on ureteroscopy with laser lithotripsy.   - start tamsulosin 0.4mg daily until stone passes or is surgically removed  - advised Ibuprofen  PRN for mild to moderate pain, Norco PRN for severe pain. Discussed that NSAIDs would need to be stopped 7 days prior to surgical interventions; pt states that he will try to stick with Tylenol for pain relief, does not like how he feels when taking Norco  - avoid constipation  - push fluids  - stone prevention (at least 8-64 oz water daily, low salt in diet <2300mg/day, and increase citrate intake)  - will need 24hr urine in future    2.1cm hypodense lesion medial Left hepatic lobe  As seen on today's CT scan, pt advised to follow up with PCP (radiologist recommending \"dedicated abdominal MRI with without contrast\" for further evaluation)    Follow-up: in 3 months, will need to discuss 24 hr urine test in more detail prior to collecting sample    PRIMITIVO Valladares  Department of Urology  [facility]            [1]   Past Medical History:   Cancer (HCC)   [2]   Past Surgical History:  Procedure Laterality Date    Colonoscopy N/A 8/18/2021    Procedure: COLONOSCOPY, POSSIBLE BIOPSY, POSSIBLE POLYPECTOMY;  Surgeon: Isaac Juan DO;  Location: [facility]   [3]   Family History  Problem Relation Age of Onset    Heart Disorder Father         CHF    Cancer Father         LUNG    Other (Other) Father         SCOLIOSIS and degenerative disc disease    No Known Problems Daughter     No Known Problems Son     Cancer Maternal Grandfather         LUNG    Heart Disorder Paternal Grandmother         CHF    Cancer Paternal Grandfather     Other (Other) Sister         DEGENERATIVE DISC DISEASE    No Known Problems Sister    [4]   Social History  Socioeconomic History    Marital status:    Tobacco Use    Smoking status: Never     Passive exposure: Never    Smokeless tobacco: Never   Substance and Sexual Activity    Alcohol use: Yes     Comment: once weekly    Drug use: No     Social Drivers of Health     Food Insecurity: No Food Insecurity (9/29/2024)    Received from [facility]     Hunger Vital Sign     Worried About Running Out of Food in the Last Year: Never true     Ran Out of Food in the Last Year: Never true   Transportation Needs: No Transportation Needs (9/29/2024)    Received from [facility]    PRAPARE - Transportation     Lack of Transportation (Medical): No     Lack of Transportation (Non-Medical): No   Housing Stability: Unknown (9/29/2024)    Received from [facility]    Housing Stability Vital Sign     Unable to Pay for Housing in the Last Year: No   [5]   Current Outpatient Medications   Medication Sig Dispense Refill    HYDROcodone-acetaminophen 5-325 MG Oral Tab Take 1-2 tablets by mouth every 6 (six) hours as needed. 10 tablet 0    ondansetron 4 MG Oral Tablet Dispersible Take 1 tablet (4 mg total) by mouth every 4 (four) hours as needed for Nausea. 10 tablet 0    cyclobenzaprine 5 MG Oral Tab Take 5 mg by mouth 3 (three) times daily as needed.      lidocaine 5 % External Patch Apply 1 patch topically daily.      naproxen 250 MG Oral Tab Take 500 mg by mouth 2 (two) times daily. (Patient not taking: Reported on 4/26/2021 )      predniSONE 10 MG Oral Tab 3 pills for 3 days, 2 pills for 3 days, then one pill daily for 3 days 18 tablet 0   [6] No Known Allergies

## 2025-04-16 NOTE — ED INITIAL ASSESSMENT (HPI)
Patient here with pain to left hip/side pain. States had left flank pain a couple of days ago. Patient started eating oatmeal with lencho seeds daily for 2 weeks and thinks it may be diverticulitis. Patient stopped the oatmeal, pain went away but now pain has come back. Patient very uncomfortable and restless, unable to sit still in wheelchair.

## 2025-04-16 NOTE — TELEPHONE ENCOUNTER
Urology Surgery Request  Surgeon: patient choice  Location (if known): [facility]  Procedure: cystoscopy, LEFT ureteroscopy, laser lithotripsy, stone extraction, retrograde pyelogram, LEFT stent placement  Anesthesia: General   Time Frame: 1st available within next 1-3 weeks due to severity of pain  Time required: ~60 minutes  Diagnosis: obstructing 9mm proximal Left ureteral stone, Left hydronephrosis    Antibiotics: per hospital protocol unless checked below   ___ Levaquin 500 mg IV   ___ Gemcitabine 2g/mL NS bladder instillation to be given in OR    Estimated Post Op/Follow Up Appt: ~ 1 week for stent removal. Please schedule appointment at time of surgery scheduling.  Patient has been advised to hold any PRN NSAIDs 7 days prior to procedure

## 2025-04-17 ENCOUNTER — HOSPITAL ENCOUNTER (INPATIENT)
Facility: HOSPITAL | Age: 55
LOS: 1 days | Discharge: HOME | End: 2025-04-18
Attending: EMERGENCY MEDICINE | Admitting: STUDENT IN AN ORGANIZED HEALTH CARE EDUCATION/TRAINING PROGRAM
Payer: COMMERCIAL

## 2025-04-17 ENCOUNTER — HOSPITAL ENCOUNTER (OUTPATIENT)
Facility: HOSPITAL | Age: 55
Setting detail: OBSERVATION
Discharge: HOME | End: 2025-04-18
Attending: EMERGENCY MEDICINE | Admitting: STUDENT IN AN ORGANIZED HEALTH CARE EDUCATION/TRAINING PROGRAM
Payer: COMMERCIAL

## 2025-04-17 DIAGNOSIS — R11.2 NAUSEA AND VOMITING IN ADULT: ICD-10-CM

## 2025-04-17 DIAGNOSIS — N20.1 URETERAL STONE: ICD-10-CM

## 2025-04-17 DIAGNOSIS — N20.0 KIDNEY STONE: ICD-10-CM

## 2025-04-17 DIAGNOSIS — N23 RENAL COLIC: ICD-10-CM

## 2025-04-17 DIAGNOSIS — N20.1 CALCULUS OF PROXIMAL LEFT URETER: Primary | ICD-10-CM

## 2025-04-17 LAB
ALBUMIN SERPL-MCNC: 4.2 G/DL (ref 3.2–4.8)
ALBUMIN/GLOB SERPL: 1.6 {RATIO} (ref 1–2)
ALP LIVER SERPL-CCNC: 46 U/L (ref 45–117)
ALT SERPL-CCNC: 16 U/L (ref 10–49)
ANION GAP SERPL CALC-SCNC: 6 MMOL/L (ref 0–18)
AST SERPL-CCNC: 16 U/L (ref ?–34)
BASOPHILS # BLD AUTO: 0.03 X10(3) UL (ref 0–0.2)
BASOPHILS NFR BLD AUTO: 0.4 %
BILIRUB SERPL-MCNC: 1 MG/DL (ref 0.3–1.2)
BILIRUB UR QL STRIP.AUTO: NEGATIVE
BUN BLD-MCNC: 13 MG/DL (ref 9–23)
CALCIUM BLD-MCNC: 9.6 MG/DL (ref 8.7–10.6)
CHLORIDE SERPL-SCNC: 109 MMOL/L (ref 98–112)
CLARITY UR REFRACT.AUTO: CLEAR
CO2 SERPL-SCNC: 28 MMOL/L (ref 21–32)
CREAT BLD-MCNC: 1.1 MG/DL (ref 0.7–1.3)
EGFRCR SERPLBLD CKD-EPI 2021: 80 ML/MIN/1.73M2 (ref 60–?)
EOSINOPHIL # BLD AUTO: 0.07 X10(3) UL (ref 0–0.7)
EOSINOPHIL NFR BLD AUTO: 1 %
ERYTHROCYTE [DISTWIDTH] IN BLOOD BY AUTOMATED COUNT: 12.1 %
GLOBULIN PLAS-MCNC: 2.7 G/DL (ref 2–3.5)
GLUCOSE BLD-MCNC: 121 MG/DL (ref 70–99)
GLUCOSE UR STRIP.AUTO-MCNC: NORMAL MG/DL
HCT VFR BLD AUTO: 43.2 % (ref 39–53)
HGB BLD-MCNC: 15.2 G/DL (ref 13–17.5)
IMM GRANULOCYTES # BLD AUTO: 0.02 X10(3) UL (ref 0–1)
IMM GRANULOCYTES NFR BLD: 0.3 %
KETONES UR STRIP.AUTO-MCNC: NEGATIVE MG/DL
LEUKOCYTE ESTERASE UR QL STRIP.AUTO: NEGATIVE
LYMPHOCYTES # BLD AUTO: 1.08 X10(3) UL (ref 1–4)
LYMPHOCYTES NFR BLD AUTO: 14.8 %
MCH RBC QN AUTO: 31.5 PG (ref 26–34)
MCHC RBC AUTO-ENTMCNC: 35.2 G/DL (ref 31–37)
MCV RBC AUTO: 89.4 FL (ref 80–100)
MONOCYTES # BLD AUTO: 0.56 X10(3) UL (ref 0.1–1)
MONOCYTES NFR BLD AUTO: 7.7 %
NEUTROPHILS # BLD AUTO: 5.53 X10 (3) UL (ref 1.5–7.7)
NEUTROPHILS # BLD AUTO: 5.53 X10(3) UL (ref 1.5–7.7)
NEUTROPHILS NFR BLD AUTO: 75.8 %
NITRITE UR QL STRIP.AUTO: NEGATIVE
OSMOLALITY SERPL CALC.SUM OF ELEC: 297 MOSM/KG (ref 275–295)
PH UR STRIP.AUTO: 6.5 [PH] (ref 5–8)
PLATELET # BLD AUTO: 234 10(3)UL (ref 150–450)
POTASSIUM SERPL-SCNC: 4 MMOL/L (ref 3.5–5.1)
PROT SERPL-MCNC: 6.9 G/DL (ref 5.7–8.2)
PROT UR STRIP.AUTO-MCNC: NEGATIVE MG/DL
RBC # BLD AUTO: 4.83 X10(6)UL (ref 4.3–5.7)
SODIUM SERPL-SCNC: 143 MMOL/L (ref 136–145)
SP GR UR STRIP.AUTO: 1.02 (ref 1–1.03)
UROBILINOGEN UR STRIP.AUTO-MCNC: NORMAL MG/DL
WBC # BLD AUTO: 7.3 X10(3) UL (ref 4–11)

## 2025-04-17 PROCEDURE — 99222 1ST HOSP IP/OBS MODERATE 55: CPT | Performed by: PHYSICIAN ASSISTANT

## 2025-04-17 PROCEDURE — 99222 1ST HOSP IP/OBS MODERATE 55: CPT | Performed by: STUDENT IN AN ORGANIZED HEALTH CARE EDUCATION/TRAINING PROGRAM

## 2025-04-17 RX ORDER — PROCHLORPERAZINE EDISYLATE 5 MG/ML
5 INJECTION INTRAMUSCULAR; INTRAVENOUS EVERY 8 HOURS PRN
Status: DISCONTINUED | OUTPATIENT
Start: 2025-04-17 | End: 2025-04-18

## 2025-04-17 RX ORDER — MORPHINE SULFATE 4 MG/ML
4 INJECTION, SOLUTION INTRAMUSCULAR; INTRAVENOUS ONCE
Status: COMPLETED | OUTPATIENT
Start: 2025-04-17 | End: 2025-04-17

## 2025-04-17 RX ORDER — ONDANSETRON 2 MG/ML
4 INJECTION INTRAMUSCULAR; INTRAVENOUS ONCE
Status: COMPLETED | OUTPATIENT
Start: 2025-04-17 | End: 2025-04-17

## 2025-04-17 RX ORDER — ACETAMINOPHEN 500 MG
1000 TABLET ORAL EVERY 4 HOURS PRN
Status: DISCONTINUED | OUTPATIENT
Start: 2025-04-17 | End: 2025-04-18

## 2025-04-17 RX ORDER — KETOROLAC TROMETHAMINE 15 MG/ML
15 INJECTION, SOLUTION INTRAMUSCULAR; INTRAVENOUS ONCE
Status: COMPLETED | OUTPATIENT
Start: 2025-04-17 | End: 2025-04-17

## 2025-04-17 RX ORDER — HYDROMORPHONE HYDROCHLORIDE 1 MG/ML
1.2 INJECTION, SOLUTION INTRAMUSCULAR; INTRAVENOUS; SUBCUTANEOUS EVERY 2 HOUR PRN
Refills: 0 | Status: DISCONTINUED | OUTPATIENT
Start: 2025-04-17 | End: 2025-04-18

## 2025-04-17 RX ORDER — ECHINACEA PURPUREA EXTRACT 125 MG
1 TABLET ORAL
Status: DISCONTINUED | OUTPATIENT
Start: 2025-04-17 | End: 2025-04-18

## 2025-04-17 RX ORDER — HYDROMORPHONE HYDROCHLORIDE 1 MG/ML
0.2 INJECTION, SOLUTION INTRAMUSCULAR; INTRAVENOUS; SUBCUTANEOUS EVERY 2 HOUR PRN
Status: DISCONTINUED | OUTPATIENT
Start: 2025-04-17 | End: 2025-04-17

## 2025-04-17 RX ORDER — SENNOSIDES 8.6 MG
17.2 TABLET ORAL NIGHTLY PRN
Status: DISCONTINUED | OUTPATIENT
Start: 2025-04-17 | End: 2025-04-18

## 2025-04-17 RX ORDER — HYDROMORPHONE HYDROCHLORIDE 1 MG/ML
0.4 INJECTION, SOLUTION INTRAMUSCULAR; INTRAVENOUS; SUBCUTANEOUS EVERY 2 HOUR PRN
Status: DISCONTINUED | OUTPATIENT
Start: 2025-04-17 | End: 2025-04-17

## 2025-04-17 RX ORDER — DOCUSATE SODIUM 100 MG/1
100 CAPSULE, LIQUID FILLED ORAL ONCE
Status: DISCONTINUED | OUTPATIENT
Start: 2025-04-17 | End: 2025-04-18

## 2025-04-17 RX ORDER — BISACODYL 10 MG
10 SUPPOSITORY, RECTAL RECTAL
Status: DISCONTINUED | OUTPATIENT
Start: 2025-04-17 | End: 2025-04-18

## 2025-04-17 RX ORDER — TAMSULOSIN HYDROCHLORIDE 0.4 MG/1
0.4 CAPSULE ORAL
Status: DISCONTINUED | OUTPATIENT
Start: 2025-04-17 | End: 2025-04-18

## 2025-04-17 RX ORDER — KETOROLAC TROMETHAMINE 30 MG/ML
30 INJECTION, SOLUTION INTRAMUSCULAR; INTRAVENOUS EVERY 6 HOURS PRN
Status: DISCONTINUED | OUTPATIENT
Start: 2025-04-17 | End: 2025-04-18

## 2025-04-17 RX ORDER — ENOXAPARIN SODIUM 100 MG/ML
40 INJECTION SUBCUTANEOUS DAILY
Status: DISCONTINUED | OUTPATIENT
Start: 2025-04-18 | End: 2025-04-18

## 2025-04-17 RX ORDER — POLYETHYLENE GLYCOL 3350 17 G/17G
17 POWDER, FOR SOLUTION ORAL DAILY PRN
Status: DISCONTINUED | OUTPATIENT
Start: 2025-04-17 | End: 2025-04-18

## 2025-04-17 RX ORDER — HYDROMORPHONE HYDROCHLORIDE 1 MG/ML
0.8 INJECTION, SOLUTION INTRAMUSCULAR; INTRAVENOUS; SUBCUTANEOUS EVERY 2 HOUR PRN
Refills: 0 | Status: DISCONTINUED | OUTPATIENT
Start: 2025-04-17 | End: 2025-04-18

## 2025-04-17 RX ORDER — SODIUM CHLORIDE 9 MG/ML
INJECTION, SOLUTION INTRAVENOUS CONTINUOUS
Status: DISCONTINUED | OUTPATIENT
Start: 2025-04-17 | End: 2025-04-18

## 2025-04-17 RX ORDER — SODIUM PHOSPHATE, DIBASIC AND SODIUM PHOSPHATE, MONOBASIC 7; 19 G/230ML; G/230ML
1 ENEMA RECTAL ONCE AS NEEDED
Status: DISCONTINUED | OUTPATIENT
Start: 2025-04-17 | End: 2025-04-18

## 2025-04-17 RX ORDER — HYDROMORPHONE HYDROCHLORIDE 1 MG/ML
0.1 INJECTION, SOLUTION INTRAMUSCULAR; INTRAVENOUS; SUBCUTANEOUS EVERY 2 HOUR PRN
Status: DISCONTINUED | OUTPATIENT
Start: 2025-04-17 | End: 2025-04-17

## 2025-04-17 RX ORDER — ONDANSETRON 2 MG/ML
4 INJECTION INTRAMUSCULAR; INTRAVENOUS EVERY 6 HOURS PRN
Status: DISCONTINUED | OUTPATIENT
Start: 2025-04-17 | End: 2025-04-18

## 2025-04-17 RX ORDER — HYDROMORPHONE HYDROCHLORIDE 1 MG/ML
0.4 INJECTION, SOLUTION INTRAMUSCULAR; INTRAVENOUS; SUBCUTANEOUS EVERY 2 HOUR PRN
Refills: 0 | Status: DISCONTINUED | OUTPATIENT
Start: 2025-04-17 | End: 2025-04-18

## 2025-04-17 NOTE — ED INITIAL ASSESSMENT (HPI)
Left side flank pain, dx with kidney stone yesterday. Last took pain meds this morning but had emesis.

## 2025-04-17 NOTE — ED PROVIDER NOTES
Patient Seen in: [facility] Emergency Department      History     Chief Complaint   Patient presents with    Abdomen/Flank Pain    Nausea/Vomiting/Diarrhea     Here yesterday has 9mm stone     Stated Complaint: kidney stone    Subjective:   HPI    54-year-old male presents with worsening pain and vomiting after being diagnosed with a kidney stone yesterday.  Patient states left flank pain started about 2 days ago.  He was seen at this ED early yesterday morning and diagnosed with a kidney stone.  Wife states they followed up with the urologist yesterday afternoon was going to schedule him for procedure to remove the stone.  Patient states he vomited twice last night and again this morning was unable to tolerate pain medication.  He also reports some difficulty urinating this morning.  Denies fever.    History of Present Illness               Objective:     Past Medical History:    Cancer (HCC)              No pertinent past surgical history.              No pertinent social history.                              Physical Exam     ED Triage Vitals [04/17/25 0636]   /77   Pulse 65   Resp 16   Temp 97.3 °F (36.3 °C)   Temp src Temporal   SpO2 99 %   O2 Device None (Room air)       Current Vitals:   Vital Signs  BP: 132/90  Pulse: 57  Resp: 22  Temp: 97.3 °F (36.3 °C)  Temp src: Temporal  MAP (mmHg): (!) 104    Oxygen Therapy  SpO2: 97 %  O2 Device: None (Room air)        Physical Exam  Vitals and nursing note reviewed.   Constitutional:       General: He is not in acute distress.     Appearance: He is well-developed. He is not ill-appearing.   HENT:      Head: Normocephalic and atraumatic.      Mouth/Throat:      Mouth: Mucous membranes are moist.   Eyes:      General: No scleral icterus.     Extraocular Movements: Extraocular movements intact.   Cardiovascular:      Rate and Rhythm: Normal rate and regular rhythm.   Pulmonary:      Effort: Pulmonary effort is normal.      Breath sounds: Normal breath  sounds.   Abdominal:      General: Bowel sounds are normal. There is no distension.      Palpations: Abdomen is soft.      Tenderness: There is abdominal tenderness. There is no guarding.      Comments: Mild left upper quadrant and left CVA tenderness   Musculoskeletal:      Cervical back: Neck supple.   Skin:     General: Skin is warm and dry.      Capillary Refill: Capillary refill takes less than 2 seconds.   Neurological:      Mental Status: He is alert and oriented to person, place, and time.      GCS: GCS eye subscore is 4. GCS verbal subscore is 5. GCS motor subscore is 6.   Psychiatric:         Mood and Affect: Mood normal.         Behavior: Behavior normal.           Physical Exam                ED Course     Labs Reviewed   COMP METABOLIC PANEL (14) - Abnormal; Notable for the following components:       Result Value    Glucose 121 (*)     Calculated Osmolality 297 (*)     All other components within normal limits   CBC WITH DIFFERENTIAL WITH PLATELET   URINALYSIS WITH CULTURE REFLEX   RAINBOW DRAW BLUE   RAINBOW DRAW GOLD          Results                                 MDM      54-year-old male presents with worsening pain and vomiting after being diagnosed with a kidney stone yesterday.    Chart review from ED visit yesterday shows patient presented with left flank pain.  Patient had CT abdomen/pelvis that showed a 9 mm stone in the proximal left ureter with mild hydroperinephric stranding.  UA without evidence UTI.  Renal function was normal.  Patient discharged home with Rx for Norco and Zofran.      Differential includes but is not limited to renal colic, dehydration, electrolyte abnormality, UTI    Labs show normal WBC, hemoglobin, electrolytes and renal function.  Urinalysis pending.    Patient treated with morphine, Toradol, Zofran and IV fluids with improvement in pain.    Discussed with urology APN, recommends admission to hospitalist and keep patient n.p.o. for likely procedure today.   Discussed with hospitalist Dr. Monroy.      Admission disposition: 4/17/2025  8:55 AM           Medical Decision Making  Amount and/or Complexity of Data Reviewed  Independent Historian: spouse  External Data Reviewed: labs, radiology and notes.     Details: See MDM  Labs: ordered. Decision-making details documented in ED Course.  Radiology: ordered and independent interpretation performed. Decision-making details documented in ED Course.  Discussion of management or test interpretation with external provider(s): Neurology, hospitalist    Risk  Parenteral controlled substances.  Decision regarding hospitalization.        Disposition and Plan     Clinical Impression:  1. Calculus of proximal left ureter    2. Nausea and vomiting in adult         Disposition:  Admit  4/17/2025  8:55 am    Follow-up:  No follow-up provider specified.        Medications Prescribed:  Current Discharge Medication List          Supplementary Documentation:         Hospital Problems       Present on Admission  Date Reviewed: 4/16/2025          ICD-10-CM Noted POA    * (Principal) Calculus of proximal left ureter N20.1 4/17/2025 Unknown

## 2025-04-17 NOTE — ED QUICK NOTES
Orders for admission, patient is aware of plan and ready to go upstairs. Any questions, please call ED RN Eliceo at extension [phone].     Patient Covid vaccination status: Fully vaccinated     COVID Test Ordered in ED: None    COVID Suspicion at Admission: N/A    Running Infusions: Medication Infusions[1] None    Mental Status/LOC at time of transport: A&Ox4    Other pertinent information:   CIWA score: N/A   NIH score:  N/A             [1]

## 2025-04-17 NOTE — H&P
[facility]     Aquilino Lynn II Patient Status:  Inpatient    1970 MRN [id number]   Location [facility] 0SW-A Attending Priscila Monroy MD   Hosp Day # 0 PCP Yobani Dietz DO     Chief Complaint: Flank pain     Subjective:    History of Present Illness:     Aquilino Lynn II is a 54 year old male with  h/p nephrolithiasis . Pt was in ER  with flank pain due to nephrolithiasis. HE returns to the hospital with severe flank pain, nausea.     History/Other:    Past Medical History:  Past Medical History[1]  Past Surgical History:   Past Surgical History[2]   Family History:   Family History[3]  Social History:    reports that he has never smoked. He has never been exposed to tobacco smoke. He has never used smokeless tobacco. He reports current alcohol use. He reports that he does not use drugs.     Allergies: Allergies[4]    Medications:  Medications Ordered Prior to Encounter[5]    Review of Systems:   A comprehensive review of systems was completed.    Pertinent positives and negatives noted in the HPI.    Objective:   Physical Exam:    /79 (BP Location: Right arm)   Pulse 52   Temp 97.9 °F (36.6 °C) (Oral)   Resp 16   Wt 178 lb 9.2 oz (81 kg)   SpO2 93%   BMI 24.22 kg/m²   General: No acute distress, Alert  Respiratory: No rhonchi, no wheezes  Cardiovascular: S1, S2. Regular rate and rhythm  Abdomen: Soft, Non-tender, non-distended, positive bowel sounds  Neuro: No new focal deficits  Extremities: No edema      Results:    Labs:      Labs Last 24 Hours:    Recent Labs   Lab 25  0310 25  0731   RBC 4.97 4.83   HGB 15.7 15.2   HCT 43.3 43.2   MCV 87.1 89.4   MCH 31.6 31.5   MCHC 36.3 35.2   RDW 11.9 12.1   NEPRELIM 2.35 5.53   WBC 5.1 7.3   .0 234.0       Recent Labs   Lab 25  0310 25  0731   * 121*   BUN 16 13   CREATSERUM 1.17 1.10   EGFRCR 74 80   CA 9.4 9.6   ALB 4.3 4.2    143   K 4.0 4.0    109    CO2 21.0 28.0   ALKPHO 46 46   AST 17 16   ALT 17 16   BILT 0.9 1.0   TP 7.1 6.9       Estimated Glomerular Filtration Rate: 80 mL/min/1.73m2 (result from lab).    No results found for: \"PT\", \"INR\"    No results for input(s): \"TROP\", \"TROPHS\", \"CK\" in the last 168 hours.    No results for input(s): \"TROP\", \"PBNP\" in the last 168 hours.    No results for input(s): \"PCT\" in the last 168 hours.    Imaging: Imaging data reviewed in Epic.    Assessment & Plan:      #Flank pain due to nephrolithiasis  Pain control  IVF  Anti emetics  Uro on Cs- NPO at [place] plan for cysto on 4/18             Plan of care discussed with pt, spouse, RN    Priscila Monroy MD    Supplementary Documentation:     The 21st Century Cures Act makes medical notes like these available to patients in the interest of transparency. Please be advised this is a medical document. Medical documents are intended to carry relevant information, facts as evident, and the clinical opinion of the practitioner. The medical note is intended as peer to peer communication and may appear blunt or direct. It is written in medical language and may contain abbreviations or verbiage that are unfamiliar.                                       [1]   Past Medical History:   Cancer (HCC)    Nausea and vomiting in adult   [2]   Past Surgical History:  Procedure Laterality Date    Colonoscopy N/A 8/18/2021    Procedure: COLONOSCOPY, POSSIBLE BIOPSY, POSSIBLE POLYPECTOMY;  Surgeon: Isaac Juan DO;  Location: [facility]   [3]   Family History  Problem Relation Age of Onset    Heart Disorder Father         CHF    Cancer Father         LUNG    Other (Other) Father         SCOLIOSIS and degenerative disc disease    No Known Problems Daughter     No Known Problems Son     Cancer Maternal Grandfather         LUNG    Heart Disorder Paternal Grandmother         CHF    Cancer Paternal Grandfather     Other (Other) Sister         DEGENERATIVE DISC DISEASE    No Known Problems Sister     [4] No Known Allergies  [5]   Current Facility-Administered Medications on File Prior to Encounter   Medication Dose Route Frequency Provider Last Rate Last Admin    [COMPLETED] ketorolac (Toradol) 15 MG/ML injection 15 mg  15 mg Intravenous Once Darrin Crabtree MD   15 mg at 04/16/25 0302    [COMPLETED] ondansetron (Zofran) 4 MG/2ML injection 4 mg  4 mg Intravenous Once Darrin Crabtree MD   4 mg at 04/16/25 0302    [COMPLETED] sodium chloride 0.9 % IV bolus 1,000 mL  1,000 mL Intravenous Once Darrin Crabtree MD   Stopped at 04/16/25 0404     Current Outpatient Medications on File Prior to Encounter   Medication Sig Dispense Refill    Omega-3 Fatty Acids (FISH OIL OR) Take 2 capsules by mouth nightly.      MAGNESIUM OR Take 2 tablets by mouth nightly.      HYDROcodone-acetaminophen 5-325 MG Oral Tab Take 1-2 tablets by mouth every 6 (six) hours as needed. 10 tablet 0    ondansetron 4 MG Oral Tablet Dispersible Take 1 tablet (4 mg total) by mouth every 4 (four) hours as needed for Nausea. 10 tablet 0    tamsulosin 0.4 MG Oral Cap Take 1 capsule (0.4 mg total) by mouth After dinner. Take 1/2 hour following the same meal each day 30 capsule 0

## 2025-04-17 NOTE — CONSULTS
[facility]   part of [facility]    Report of Consultation    Aquilino Lynn II Patient Status:  Emergency    1970 MRN [id number]   formerly [facility] EMERGENCY DEPARTMENT Attending Agnes Kemp MD   Hosp Day # 0 PCP Yobani Dietz DO     Date of Admission:  2025  Date of Consult:  2025    Reason for Consultation:  Intractable pain/vomiting secondary to ureteral stone    History of Present Illness:  Aquilino Lynn II is a a(n) 54 year old male with hx of melanoma, who returned to the ED this morning for intractable pain after being diagnosed with obstructing 9 mm proximal left ureteral stone yesterday after presenting to the ED for left flank pain. Patient afebrile and hemodynamically stable. Labs with normal white count and serum creatinine. UA pending, but UA from yesterday >10 RBC/hpf only. No formal culture sent. Patient being admitted for further evaluation and mgmt. Urology consulted.     Patient seen yesterday by KIRK Esparza and plan for ureteroscopy, which had yet to be scheduled. Patient notes pain worsened overnight with inability to tolerate PO, so he returned to the ED. No fevers. No voiding complaints.     Prior stone ~20 years, passed spontaneously. No prior treatment.     History:  Past Medical History[1]  Past Surgical History[2]  Family History[3]   reports that he has never smoked. He has never been exposed to tobacco smoke. He has never used smokeless tobacco. He reports current alcohol use. He reports that he does not use drugs.    Allergies:  Allergies[4]    Medications:  Current Hospital Medications[5]    Review of Systems:  Pertinent items are noted in HPI.    Physical Exam:  /73   Pulse 51   Temp 97.3 °F (36.3 °C) (Temporal)   Resp 14   Wt 178 lb 9.2 oz (81 kg)   SpO2 100%   BMI 24.22 kg/m²   General appearance: alert, appears stated age, cooperative, and mild distress  Head: Normocephalic, without obvious abnormality,  atraumatic  Back:  Left CVAT  Lungs: non labored respirations  Abdomen: soft, LLQ tenderness  Neurologic: Grossly normal  Psych appropriate affect and mood    Laboratory Data:  Lab Results   Component Value Date    WBC 7.3 04/17/2025    HGB 15.2 04/17/2025    HCT 43.2 04/17/2025    .0 04/17/2025    CREATSERUM 1.10 04/17/2025    BUN 13 04/17/2025     04/17/2025    K 4.0 04/17/2025     04/17/2025    CO2 28.0 04/17/2025     04/17/2025    CA 9.6 04/17/2025    ALB 4.2 04/17/2025    ALKPHO 46 04/17/2025    BILT 1.0 04/17/2025    TP 6.9 04/17/2025    AST 16 04/17/2025    ALT 16 04/17/2025       Urinalysis Results (last three years):  Recent Labs     04/16/25  0405   COLORUR Yellow   CLARITY Turbid*   SPECGRAVITY 1.026   PHURINE 6.5   PROUR 30*   GLUUR Normal   KETUR Trace*   BILUR Negative   BLOODURINE 3+*   NITRITE Negative   UROBILINOGEN Normal   LEUUR Negative   WBCUR 1-5   RBCUR >10*   BACUR None Seen       Urine Culture Results (last three years):  No results found for: \"URINECUL\"    Imaging  CT ABDOMEN+PELVIS KIDNEYSTONE 2D RNDR(NO IV,NO ORAL)(CPT=74176)  Result Date: 4/16/2025  PROCEDURE:  CT ABDOMEN+PELVIS KIDNEYSTONE 2D RNDR(NO IV,NO ORAL)(CPT=74176)  COMPARISON:  None.  INDICATIONS:  pt wife states bad abd/left flank pain  TECHNIQUE:  Unenhanced multislice CT scanning from above the kidneys to below the urinary bladder.  2D rendering are generated on the CT scanner workstation to localize potential stones in the cranio-caudal plane.  Dose reduction techniques were used. Dose information is transmitted to the ACR (American College of Radiology) NRDR (National Radiology Data Registry) which includes the Dose Index Registry.  PATIENT STATED HISTORY: (As transcribed by Technologist)  pt wife states bad abd/left flank pain    FINDINGS: Evaluation of the visceral organs is limited due to the lack of IV contrast. LUNG BASE:  Scattered atelectasis/scarring LIVER:  The hypodense lesion in the  medial left hepatic lobe with higher than fluid attenuation measuring up to 2.1 cm is incompletely characterized on the basis of this exam.  Dedicated abdominal MRI with without contrast could be performed for further evaluation as clinically directed.  Additional subcentimeter hypodensities in the liver measuring up to 8 mm are too small to further characterize and warrant no specific follow-up. BILIARY:  Unremarkable. SPLEEN:  Unremarkable. PANCREAS:  Unremarkable. ADRENALS:  Unremarkable. KIDNEYS:  9 mm obstructing stone in the proximal left ureter positioned at the approximate L2-3 level resulting in mild left hydronephrosis and mild left perinephric stranding. AORTA/VASCULAR:  Unremarkable. RETROPERITONEUM:  Unremarkable. BOWEL/MESENTERY:  Nonspecific haziness in the small bowel mesentery.  Unremarkable appendix.  Uncomplicated colonic diverticulosis.  Scattered feces in the colon.  No large or small bowel dilatation.  No free air or free fluid. ABDOMINAL WALL:  Small fat containing umbilical hernia. PELVIC ORGANS:  Urinary bladder wall thickening with fatty induration is concerning for cystitis.  Clinical correlation recommended.  Prostate calcifications. LYMPH NODES:  No lymphadenopathy in the abdomen or pelvis. BONES:  Bilateral L5 pars defects with grade 1 anterolisthesis of L5 on S1.  Degenerative changes the spine. OTHER:  None.             CONCLUSION:   1. 9 mm obstructing stone in the proximal left ureter positioned at the approximate L2-3 level resulting in mild left hydronephrosis and mild left perinephric stranding.  2. Urinary bladder wall thickening with fatty induration is concerning for cystitis.  Clinical correlation recommended.  3. The hypodense lesion in the medial left hepatic lobe with higher than fluid attenuation measuring up to 2.1 cm is incompletely characterized on the basis of this exam.  Dedicated abdominal MRI with without contrast could be performed for further evaluation as  clinically directed.  4. Uncomplicated colonic diverticulosis.     Please see above for further details.  A preliminary report was provided by the [facility] teleradiology service.  LOCATION:  [facility]   Dictated by (CST): Darci Covarrubias MD on 4/16/2025 at 6:01 AM     Finalized by (CST): Darci Covarrubias MD on 4/16/2025 at 6:04 AM             Impression:  Problem List[6]    54 year old male with hx of melanoma, who returned to the ED this morning for intractable pain after being diagnosed with obstructing 9 mm proximal left ureteral stone yesterday after presenting to the ED for left flank pain. Patient afebrile and hemodynamically stable. Labs with normal white count and serum creatinine. UA pending, but UA from yesterday >10 RBC/hpf only. No formal culture sent. Patient being admitted for further evaluation and mgmt. Urology consulted.     Reviewed imaging with patient, no additional stones noted.  Surgical risks, benefits and alternatives discussed.    Risks of ureteroscopy including but not limited to infection, bleeding, anesthetic issues, possible need for stent, need for subsequent removal of stent, ureteral spasm, ureteral injury or stricture discussed with patient.     Recommendations:  Continue IVF, pain medication prn. Strain all urine.  NPO at midnight for ureteroscopy tomorrow, 4/18/25, with Dr. Stefany Rodgers.    We will follow.    Thank you for allowing me to participate in the care of your patient.    Donna Fiore PA-C  [facility]  4/17/2025           [1]   Past Medical History:   Cancer (HCC)   [2]   Past Surgical History:  Procedure Laterality Date    Colonoscopy N/A 8/18/2021    Procedure: COLONOSCOPY, POSSIBLE BIOPSY, POSSIBLE POLYPECTOMY;  Surgeon: Isaac Juan DO;  Location: [facility]   [3]   Family History  Problem Relation Age of Onset    Heart Disorder Father         CHF    Cancer Father         LUNG    Other (Other) Father         SCOLIOSIS and degenerative disc disease     No Known Problems Daughter     No Known Problems Son     Cancer Maternal Grandfather         LUNG    Heart Disorder Paternal Grandmother         CHF    Cancer Paternal Grandfather     Other (Other) Sister         DEGENERATIVE DISC DISEASE    No Known Problems Sister    [4] No Known Allergies  [5]   Current Facility-Administered Medications:     docusate sodium (Colace) cap 100 mg, 100 mg, Oral, Once    sodium chloride 0.9 % IV bolus 1,000 mL, 1,000 mL, Intravenous, Once  [6]   Patient Active Problem List  Diagnosis    Cutaneous malignant melanoma (HCC)    Calculus of proximal left ureter

## 2025-04-17 NOTE — TELEPHONE ENCOUNTER
Patient admitted to hospital today.  Plan for URS tomorrow with Dr. Rodgers.  Surgery ticket sent.

## 2025-04-18 ENCOUNTER — ANESTHESIA (OUTPATIENT)
Dept: SURGERY | Facility: HOSPITAL | Age: 55
End: 2025-04-18
Payer: COMMERCIAL

## 2025-04-18 ENCOUNTER — TELEPHONE (OUTPATIENT)
Dept: SURGERY | Facility: CLINIC | Age: 55
End: 2025-04-18

## 2025-04-18 ENCOUNTER — APPOINTMENT (OUTPATIENT)
Dept: GENERAL RADIOLOGY | Facility: HOSPITAL | Age: 55
End: 2025-04-18
Attending: UROLOGY
Payer: COMMERCIAL

## 2025-04-18 ENCOUNTER — ANESTHESIA EVENT (OUTPATIENT)
Dept: SURGERY | Facility: HOSPITAL | Age: 55
End: 2025-04-18
Payer: COMMERCIAL

## 2025-04-18 VITALS
OXYGEN SATURATION: 97 % | RESPIRATION RATE: 18 BRPM | DIASTOLIC BLOOD PRESSURE: 60 MMHG | SYSTOLIC BLOOD PRESSURE: 128 MMHG | TEMPERATURE: 98 F | HEART RATE: 60 BPM | WEIGHT: 178.56 LBS | BODY MASS INDEX: 24 KG/M2

## 2025-04-18 LAB
ANION GAP SERPL CALC-SCNC: 11 MMOL/L (ref 0–18)
BASOPHILS # BLD AUTO: 0.02 X10(3) UL (ref 0–0.2)
BASOPHILS NFR BLD AUTO: 0.3 %
BUN BLD-MCNC: 11 MG/DL (ref 9–23)
CALCIUM BLD-MCNC: 8.5 MG/DL (ref 8.7–10.6)
CHLORIDE SERPL-SCNC: 108 MMOL/L (ref 98–112)
CO2 SERPL-SCNC: 23 MMOL/L (ref 21–32)
CREAT BLD-MCNC: 1.17 MG/DL (ref 0.7–1.3)
EGFRCR SERPLBLD CKD-EPI 2021: 74 ML/MIN/1.73M2 (ref 60–?)
EOSINOPHIL # BLD AUTO: 0.13 X10(3) UL (ref 0–0.7)
EOSINOPHIL NFR BLD AUTO: 1.9 %
ERYTHROCYTE [DISTWIDTH] IN BLOOD BY AUTOMATED COUNT: 12.2 %
GLUCOSE BLD-MCNC: 115 MG/DL (ref 70–99)
HCT VFR BLD AUTO: 34.1 % (ref 39–53)
HGB BLD-MCNC: 11.8 G/DL (ref 13–17.5)
IMM GRANULOCYTES # BLD AUTO: 0.01 X10(3) UL (ref 0–1)
IMM GRANULOCYTES NFR BLD: 0.1 %
LYMPHOCYTES # BLD AUTO: 1.22 X10(3) UL (ref 1–4)
LYMPHOCYTES NFR BLD AUTO: 18 %
MAGNESIUM SERPL-MCNC: 1.8 MG/DL (ref 1.6–2.6)
MCH RBC QN AUTO: 31.3 PG (ref 26–34)
MCHC RBC AUTO-ENTMCNC: 34.6 G/DL (ref 31–37)
MCV RBC AUTO: 90.5 FL (ref 80–100)
MONOCYTES # BLD AUTO: 0.72 X10(3) UL (ref 0.1–1)
MONOCYTES NFR BLD AUTO: 10.6 %
NEUTROPHILS # BLD AUTO: 4.67 X10 (3) UL (ref 1.5–7.7)
NEUTROPHILS # BLD AUTO: 4.67 X10(3) UL (ref 1.5–7.7)
NEUTROPHILS NFR BLD AUTO: 69.1 %
OSMOLALITY SERPL CALC.SUM OF ELEC: 294 MOSM/KG (ref 275–295)
PLATELET # BLD AUTO: 182 10(3)UL (ref 150–450)
POTASSIUM SERPL-SCNC: 4.1 MMOL/L (ref 3.5–5.1)
RBC # BLD AUTO: 3.77 X10(6)UL (ref 4.3–5.7)
SODIUM SERPL-SCNC: 142 MMOL/L (ref 136–145)
WBC # BLD AUTO: 6.8 X10(3) UL (ref 4–11)

## 2025-04-18 PROCEDURE — 99238 HOSP IP/OBS DSCHRG MGMT 30/<: CPT | Performed by: STUDENT IN AN ORGANIZED HEALTH CARE EDUCATION/TRAINING PROGRAM

## 2025-04-18 PROCEDURE — 52356 CYSTO/URETERO W/LITHOTRIPSY: CPT | Performed by: UROLOGY

## 2025-04-18 PROCEDURE — 74420 UROGRAPHY RTRGR +-KUB: CPT | Performed by: UROLOGY

## 2025-04-18 PROCEDURE — 99232 SBSQ HOSP IP/OBS MODERATE 35: CPT

## 2025-04-18 DEVICE — URETERAL STENT
Type: IMPLANTABLE DEVICE | Site: URETER | Status: FUNCTIONAL
Brand: CONTOUR™

## 2025-04-18 RX ORDER — HYDROCODONE BITARTRATE AND ACETAMINOPHEN 5; 325 MG/1; MG/1
2 TABLET ORAL ONCE AS NEEDED
Status: DISCONTINUED | OUTPATIENT
Start: 2025-04-18 | End: 2025-04-18 | Stop reason: HOSPADM

## 2025-04-18 RX ORDER — ONDANSETRON 2 MG/ML
4 INJECTION INTRAMUSCULAR; INTRAVENOUS EVERY 6 HOURS PRN
Status: DISCONTINUED | OUTPATIENT
Start: 2025-04-18 | End: 2025-04-18 | Stop reason: HOSPADM

## 2025-04-18 RX ORDER — ONDANSETRON 2 MG/ML
INJECTION INTRAMUSCULAR; INTRAVENOUS AS NEEDED
Status: DISCONTINUED | OUTPATIENT
Start: 2025-04-18 | End: 2025-04-18 | Stop reason: SURG

## 2025-04-18 RX ORDER — MAGNESIUM OXIDE 400 MG/1
400 TABLET ORAL ONCE
Status: DISCONTINUED | OUTPATIENT
Start: 2025-04-18 | End: 2025-04-18

## 2025-04-18 RX ORDER — KETOROLAC TROMETHAMINE 30 MG/ML
INJECTION, SOLUTION INTRAMUSCULAR; INTRAVENOUS AS NEEDED
Status: DISCONTINUED | OUTPATIENT
Start: 2025-04-18 | End: 2025-04-18 | Stop reason: SURG

## 2025-04-18 RX ORDER — SODIUM CHLORIDE, SODIUM LACTATE, POTASSIUM CHLORIDE, CALCIUM CHLORIDE 600; 310; 30; 20 MG/100ML; MG/100ML; MG/100ML; MG/100ML
INJECTION, SOLUTION INTRAVENOUS CONTINUOUS
Status: DISCONTINUED | OUTPATIENT
Start: 2025-04-18 | End: 2025-04-18 | Stop reason: HOSPADM

## 2025-04-18 RX ORDER — NALOXONE HYDROCHLORIDE 0.4 MG/ML
0.08 INJECTION, SOLUTION INTRAMUSCULAR; INTRAVENOUS; SUBCUTANEOUS AS NEEDED
Status: DISCONTINUED | OUTPATIENT
Start: 2025-04-18 | End: 2025-04-18 | Stop reason: HOSPADM

## 2025-04-18 RX ORDER — IOPAMIDOL 612 MG/ML
INJECTION, SOLUTION INTRAVASCULAR AS NEEDED
Status: DISCONTINUED | OUTPATIENT
Start: 2025-04-18 | End: 2025-04-18 | Stop reason: HOSPADM

## 2025-04-18 RX ORDER — PROCHLORPERAZINE EDISYLATE 5 MG/ML
5 INJECTION INTRAMUSCULAR; INTRAVENOUS EVERY 8 HOURS PRN
Status: DISCONTINUED | OUTPATIENT
Start: 2025-04-18 | End: 2025-04-18 | Stop reason: HOSPADM

## 2025-04-18 RX ORDER — CEFADROXIL 500 MG/1
500 CAPSULE ORAL 2 TIMES DAILY
Qty: 6 CAPSULE | Refills: 0 | Status: SHIPPED | OUTPATIENT
Start: 2025-04-18 | End: 2025-04-21

## 2025-04-18 RX ORDER — LIDOCAINE HYDROCHLORIDE 20 MG/ML
JELLY TOPICAL AS NEEDED
Status: DISCONTINUED | OUTPATIENT
Start: 2025-04-18 | End: 2025-04-18 | Stop reason: HOSPADM

## 2025-04-18 RX ORDER — HYDROCODONE BITARTRATE AND ACETAMINOPHEN 5; 325 MG/1; MG/1
1 TABLET ORAL ONCE AS NEEDED
Status: DISCONTINUED | OUTPATIENT
Start: 2025-04-18 | End: 2025-04-18 | Stop reason: HOSPADM

## 2025-04-18 RX ORDER — HYDROCODONE BITARTRATE AND ACETAMINOPHEN 5; 325 MG/1; MG/1
1-2 TABLET ORAL EVERY 6 HOURS PRN
Qty: 12 TABLET | Refills: 0 | Status: SHIPPED | OUTPATIENT
Start: 2025-04-18

## 2025-04-18 RX ORDER — ACETAMINOPHEN 500 MG
1000 TABLET ORAL ONCE AS NEEDED
Status: DISCONTINUED | OUTPATIENT
Start: 2025-04-18 | End: 2025-04-18 | Stop reason: HOSPADM

## 2025-04-18 RX ORDER — HYDROMORPHONE HYDROCHLORIDE 1 MG/ML
0.4 INJECTION, SOLUTION INTRAMUSCULAR; INTRAVENOUS; SUBCUTANEOUS EVERY 5 MIN PRN
Status: DISCONTINUED | OUTPATIENT
Start: 2025-04-18 | End: 2025-04-18 | Stop reason: HOSPADM

## 2025-04-18 RX ORDER — DEXAMETHASONE SODIUM PHOSPHATE 4 MG/ML
VIAL (ML) INJECTION AS NEEDED
Status: DISCONTINUED | OUTPATIENT
Start: 2025-04-18 | End: 2025-04-18 | Stop reason: SURG

## 2025-04-18 RX ORDER — HYDROMORPHONE HYDROCHLORIDE 1 MG/ML
0.6 INJECTION, SOLUTION INTRAMUSCULAR; INTRAVENOUS; SUBCUTANEOUS EVERY 5 MIN PRN
Status: DISCONTINUED | OUTPATIENT
Start: 2025-04-18 | End: 2025-04-18 | Stop reason: HOSPADM

## 2025-04-18 RX ORDER — HYDROMORPHONE HYDROCHLORIDE 1 MG/ML
0.2 INJECTION, SOLUTION INTRAMUSCULAR; INTRAVENOUS; SUBCUTANEOUS EVERY 5 MIN PRN
Status: DISCONTINUED | OUTPATIENT
Start: 2025-04-18 | End: 2025-04-18 | Stop reason: HOSPADM

## 2025-04-18 RX ORDER — LIDOCAINE HYDROCHLORIDE 10 MG/ML
INJECTION, SOLUTION EPIDURAL; INFILTRATION; INTRACAUDAL; PERINEURAL AS NEEDED
Status: DISCONTINUED | OUTPATIENT
Start: 2025-04-18 | End: 2025-04-18 | Stop reason: SURG

## 2025-04-18 RX ADMIN — LIDOCAINE HYDROCHLORIDE 5 ML: 10 INJECTION, SOLUTION EPIDURAL; INFILTRATION; INTRACAUDAL; PERINEURAL at 13:40:00

## 2025-04-18 RX ADMIN — DEXAMETHASONE SODIUM PHOSPHATE 4 MG: 4 MG/ML VIAL (ML) INJECTION at 13:50:00

## 2025-04-18 RX ADMIN — KETOROLAC TROMETHAMINE 15 MG: 30 INJECTION, SOLUTION INTRAMUSCULAR; INTRAVENOUS at 14:30:00

## 2025-04-18 RX ADMIN — ONDANSETRON 4 MG: 2 INJECTION INTRAMUSCULAR; INTRAVENOUS at 14:30:00

## 2025-04-18 NOTE — ANESTHESIA POSTPROCEDURE EVALUATION
[facility]    Aquilino Lynn II Patient Status:  Inpatient   Age/Gender 54 year old male MRN [id number]   Location [facility] SURGERY Attending Priscila Monroy MD   Hosp Day # 1 PCP Yobani Dietz DO       Anesthesia Post-op Note    CYSTOSCOPY, LEFT RETROGRADE PYELOGRAM, LEFT URETEROSCOPY, LASER LITHOTRIPSY, STONE EXTRACTION, LEFT URETERAL STENT PLACEMENT    Procedure Summary       Date: 04/18/25 Room / Location:  MAIN OR 07 / [facility] MAIN OR    Anesthesia Start: 1336 Anesthesia Stop: 1454    Procedure: CYSTOSCOPY, LEFT RETROGRADE PYELOGRAM, LEFT URETEROSCOPY, LASER LITHOTRIPSY, STONE EXTRACTION, LEFT URETERAL STENT PLACEMENT (Left: Ureter) Diagnosis:       Ureteral stone      (Ureteral stone [N20.1])    Surgeons: Stefany Rodgers MD Anesthesiologist: Murphy Gamez MD    Anesthesia Type: general ASA Status: Not recorded            Anesthesia Type: general    Vitals Value Taken Time   /77 04/18/25 14:52   Temp 98.1F 04/18/25 14:54   Pulse 81 04/18/25 14:54   Resp 17 04/18/25 14:54   SpO2 96 % 04/18/25 14:54   Vitals shown include unfiled device data.        Patient Location: PACU    Anesthesia Type: general    Airway Patency: patent    Postop Pain Control: adequate    Mental Status: preanesthetic baseline    Nausea/Vomiting: none    Cardiopulmonary/Hydration status: stable euvolemic    Complications: no apparent anesthesia related complications    Postop vital signs: stable    Dental Exam: Unchanged from Preop    Patient to be discharged from PACU when criteria met.

## 2025-04-18 NOTE — TELEPHONE ENCOUNTER
Onset: today    Location / description: Patient had gallbladder surgery last week Friday. Patient says her incisions are red and the redness is expanding about 1-2 inches in width and they are warm to the touch. Patient also states that the areas around the incision sites itch. There is a red streak on the right sided incision and is about 1-2 inches in length.  Patient says her whole stomach is swollen. Denies chest pain or trouble breathing. Patient states there is some burning with urination. Had slight HA today. No temperature taken. Left incision is the most painful.    Precipitating Factors: recent sugery    Pain Scale (1 - 10), 10 highest: 7/10    Associated Symptoms: see above    What  improves / worsens symptoms: pain medication     Symptom specific medications: 2 tablets oxycodone-acetaminophen 5-325 last taken at 2145    Recent Care: 12/7/18: hospital discharge: [facility]: biliary colic    Reason for Disposition  • [1] Red streak runs from the incision AND [2] longer than 1 inch (2.5 cm)    Protocols used: POST-OP INCISION SYMPTOMS-A-AH    Patient advised to be seen tonight in the ED for above symptoms. Patient does have a ride. Patient is agreeable to be seen, no other questions or concerns at this time.   Urology staff,  This patient needs a cystoscopy and stent removal appointment with me at [facility] in the next 7 to 10 days.  No x-rays are needed.  Please call the patient and schedule.

## 2025-04-18 NOTE — PLAN OF CARE
NURSING DISCHARGE NOTE    Discharged Home via Wheelchair.  Accompanied by Spouse  Belongings Taken by patient/family.  AVS reviewed with patient.  Patient verbalized understanding.

## 2025-04-18 NOTE — PLAN OF CARE
Pt A&Ox4, on RA, VSS. Pain being managed w/ PRN pain medication (See MAR for administration).  UAL, voiding into urinal. Urine clear, yellow, clear, no sediment or stone noted at this time. Pt Moriah Solis at BS. NPO as of midnight for Cysto @ 1p today. Patient aware of POC, call light within reach, all questions answered.

## 2025-04-18 NOTE — DISCHARGE SUMMARY
[facility]     Aquilino Lynn II Patient Status:  Inpatient    1970 MRN [id number]   Location [facility] 3SW-A Attending Priscila Monroy MD   Hosp Day # 1 PCP Yobani Dietz DO     Date of Admission: 2025  Date of Discharge:   2015    Discharge Disposition: Home or Self Care    Discharge Diagnosis:  #Flank pain due to nephrolithiasis  Pain control  IVF  Anti emetics  Uro on Cs- NPO at [place] plan for cysto on      History of Present Illness:   Aquilino Lynn II is a 54 year old male with  h/p nephrolithiasis . Pt was in ER  with flank pain due to nephrolithiasis. HE returns to the hospital with severe flank pain, nausea.     Brief Synopsis:   The patient was admitted and urology consulted. Pt underwent cysto, lithotripsy , stone extraction and left sided ureteral stent insertion. DC home in stable condition.     Lace+ Score: 57  59-90 High Risk  29-58 Medium Risk  0-28   Low Risk       TCM Follow-Up Recommendation:  LACE > 58: High Risk of readmission after discharge from the hospital.      Procedures during hospitalization:   As above    Incidental or significant findings and recommendations (brief descriptions):  no    Lab/Test results pending at Discharge:   no    Consultants:  URO     Discharge Medication List:     Discharge Medications        CONTINUE taking these medications        Instructions Prescription details   FISH OIL OR      Take 2 capsules by mouth nightly.   Refills: 0     HYDROcodone-acetaminophen 5-325 MG Tabs  Commonly known as: Norco      Take 1-2 tablets by mouth every 6 (six) hours as needed.   Quantity: 12 tablet  Refills: 0     MAGNESIUM OR      Take 2 tablets by mouth nightly.   Refills: 0     ondansetron 4 MG Tbdp  Commonly known as: Zofran-ODT      Take 1 tablet (4 mg total) by mouth every 4 (four) hours as needed for Nausea.   Stop taking on: 2025  Quantity: 10 tablet  Refills: 0     tamsulosin 0.4 MG Caps  Commonly known  as: Flomax      Take 1 capsule (0.4 mg total) by mouth After dinner. Take 1/2 hour following the same meal each day   Stop taking on: May 16, 2025  Quantity: 30 capsule  Refills: 0               Where to Get Your Medications        These medications were sent to [id number] - [place], [place] - [address] [phone], [phone]  [address], [facility] [address]      Phone: [phone]   HYDROcodone-acetaminophen 5-325 MG Tabs         [place] reviewed[place] n/a     Follow-up appointment:   Stefany Rodgers MD  [address] [facility] [address]  [facility] [id number] [phone]    Follow up      Appointments for Next 30 Days 4/18/2025 - 5/18/2025      None            Vital signs:  Temp:  [97.3 °F (36.3 °C)-98.3 °F (36.8 °C)] 97.6 °F (36.4 °C)  Pulse:  [52-70] 55  Resp:  [15-18] 18  BP: (108-126)/(55-84) 109/64  SpO2:  [90 %-96 %] 95 %    Physical Exam:    General: No acute distress   Lungs: clear to auscultation  Cardiovascular: S1, S2  Abdomen: Soft      -----------------------------------------------------------------------------------------------  PATIENT DISCHARGE INSTRUCTIONS: See electronic chart    Priscila Monroy MD    Total time spent on discharge planning:  <30 minutes     The 21st Century Cures Act makes medical notes like these available to patients in the interest of transparency. Please be advised this is a medical document. Medical documents are intended to carry relevant information, facts as evident, and the clinical opinion of the practitioner. The medical note is intended as peer to peer communication and may appear blunt or direct. It is written in medical language and may contain abbreviations or verbiage that are unfamiliar.

## 2025-04-18 NOTE — ANESTHESIA PREPROCEDURE EVALUATION
PRE-OP EVALUATION    Patient Name: Aquilino Lynn II    Admit Diagnosis: Nausea and vomiting in adult [R11.2]  Calculus of proximal left ureter [N20.1]    Pre-op Diagnosis: Ureteral stone [N20.1]    CYSTOSCOPY, LEFT RETROGRADE PYELOGRAM, LEFT URETEROSCOPY, LASER LITHOTRIPSY, STONE EXTRACTION, LEFT URETERAL STENT PLACEMENT    Anesthesia Procedure: CYSTOSCOPY, LEFT RETROGRADE PYELOGRAM, LEFT URETEROSCOPY, LASER LITHOTRIPSY, STONE EXTRACTION, LEFT URETERAL STENT PLACEMENT (Left: Ureter)    Surgeons and Role:     * Stefany Rodgers MD - Primary    Pre-op vitals reviewed.  Temp: 97.6 °F (36.4 °C)  Pulse: 55  Resp: 18  BP: 109/64  SpO2: 95 %  Body mass index is 24.22 kg/m².    Current medications reviewed.  Hospital Medications:  Current Medications[1]    Outpatient Medications:   Prescriptions Prior to Admission[2]    Allergies: Patient has no known allergies.      Anesthesia Evaluation        Anesthetic Complications  (-) history of anesthetic complications         GI/Hepatic/Renal    Negative GI/hepatic/renal ROS.                             Cardiovascular    Negative cardiovascular ROS.    Exercise tolerance: good     MET: >4                                           Endo/Other    Negative endo/other ROS.                              Pulmonary    Negative pulmonary ROS.                       Neuro/Psych    Negative neuro/psych ROS.                                  Past Surgical History[3]  Social Hx on file[4]  History   Drug Use No     Available pre-op labs reviewed.  Lab Results   Component Value Date    WBC 6.8 04/18/2025    RBC 3.77 (L) 04/18/2025    HGB 11.8 (L) 04/18/2025    HCT 34.1 (L) 04/18/2025    MCV 90.5 04/18/2025    MCH 31.3 04/18/2025    MCHC 34.6 04/18/2025    RDW 12.2 04/18/2025    .0 04/18/2025     Lab Results   Component Value Date     04/18/2025    K 4.1 04/18/2025     04/18/2025    CO2 23.0 04/18/2025    BUN 11 04/18/2025    CREATSERUM 1.17 04/18/2025     (H) 04/18/2025     CA 8.5 (L) 04/18/2025            Airway      Mallampati: II  Mouth opening: >3 FB  TM distance: > 6 cm  Neck ROM: full Cardiovascular    Cardiovascular exam normal.         Dental    Dentition appears grossly intact         Pulmonary    Pulmonary exam normal.                 Other findings              ASA: 2   Plan: general  NPO status verified and patient meets guidelines.    Post-procedure pain management plan discussed with surgeon and patient.      Plan/risks discussed with: patient, spouse and child/children                Present on Admission:  **None**             [1]    lactated ringers infusion   Intravenous Continuous    lactated ringers IV bolus 500 mL  500 mL Intravenous Once PRN    atropine 0.1 MG/ML injection 0.5 mg  0.5 mg Intravenous PRN    naloxone (Narcan) 0.4 MG/ML injection 0.08 mg  0.08 mg Intravenous PRN    fentaNYL (Sublimaze) 50 mcg/mL injection 25 mcg  25 mcg Intravenous Q5 Min PRN    Or    fentaNYL (Sublimaze) 50 mcg/mL injection 50 mcg  50 mcg Intravenous Q5 Min PRN    HYDROmorphone (Dilaudid) 1 MG/ML injection 0.2 mg  0.2 mg Intravenous Q5 Min PRN    Or    HYDROmorphone (Dilaudid) 1 MG/ML injection 0.4 mg  0.4 mg Intravenous Q5 Min PRN    Or    HYDROmorphone (Dilaudid) 1 MG/ML injection 0.6 mg  0.6 mg Intravenous Q5 Min PRN    acetaminophen (Tylenol Extra Strength) tab 1,000 mg  1,000 mg Oral Once PRN    Or    HYDROcodone-acetaminophen (Norco) 5-325 MG per tab 1 tablet  1 tablet Oral Once PRN    Or    HYDROcodone-acetaminophen (Norco) 5-325 MG per tab 2 tablet  2 tablet Oral Once PRN    ondansetron (Zofran) 4 MG/2ML injection 4 mg  4 mg Intravenous Q6H PRN    prochlorperazine (Compazine) 10 MG/2ML injection 5 mg  5 mg Intravenous Q8H PRN    lidocaine (Urojet) 2 % urethral jelly    PRN    iopamidol (ISOVUE-300) 61 % injection    PRN    [COMPLETED] sodium chloride 0.9 % IV bolus 1,000 mL  1,000 mL Intravenous Once    [COMPLETED] ondansetron (Zofran) 4 MG/2ML injection 4 mg  4 mg  Intravenous Once    [COMPLETED] ketorolac (Toradol) 15 MG/ML injection 15 mg  15 mg Intravenous Once    [COMPLETED] morphINE PF 4 MG/ML injection 4 mg  4 mg Intravenous Once    [Transfer Hold] docusate sodium (Colace) cap 100 mg  100 mg Oral Once    [COMPLETED] sodium chloride 0.9 % IV bolus 1,000 mL  1,000 mL Intravenous Once    [Transfer Hold] acetaminophen (Tylenol Extra Strength) tab 1,000 mg  1,000 mg Oral Q4H PRN    [Transfer Hold] melatonin tab 3 mg  3 mg Oral Nightly PRN    [Transfer Hold] glycerin-hypromellose- (Artificial Tears) 0.2-0.2-1 % ophthalmic solution 1 drop  1 drop Both Eyes QID PRN    [Transfer Hold] sodium chloride (Saline Mist) 0.65 % nasal solution 1 spray  1 spray Each Nare Q3H PRN    sodium chloride 0.9% infusion   Intravenous Continuous    [Transfer Hold] enoxaparin (Lovenox) 40 MG/0.4ML SUBQ injection 40 mg  40 mg Subcutaneous Daily    [Transfer Hold] ondansetron (Zofran) 4 MG/2ML injection 4 mg  4 mg Intravenous Q6H PRN    [Transfer Hold] prochlorperazine (Compazine) 10 MG/2ML injection 5 mg  5 mg Intravenous Q8H PRN    [Transfer Hold] polyethylene glycol (PEG 3350) (Miralax) 17 g oral packet 17 g  17 g Oral Daily PRN    [Transfer Hold] sennosides (Senokot) tab 17.2 mg  17.2 mg Oral Nightly PRN    [Transfer Hold] bisacodyl (Dulcolax) 10 MG rectal suppository 10 mg  10 mg Rectal Daily PRN    [Transfer Hold] fleet enema (Fleet) rectal enema 133 mL  1 enema Rectal Once PRN    [Transfer Hold] ketorolac (Toradol) 30 MG/ML injection 30 mg  30 mg Intravenous Q6H PRN    [Transfer Hold] tamsulosin (Flomax) cap 0.4 mg  0.4 mg Oral After dinner    [COMPLETED] morphINE PF 4 MG/ML injection 4 mg  4 mg Intravenous Once    [Transfer Hold] HYDROmorphone (Dilaudid) 1 MG/ML injection 0.4 mg  0.4 mg Intravenous Q2H PRN    Or    [Transfer Hold] HYDROmorphone (Dilaudid) 1 MG/ML injection 0.8 mg  0.8 mg Intravenous Q2H PRN    Or    [Transfer Hold] HYDROmorphone (Dilaudid) 1 MG/ML injection 1.2 mg  1.2  mg Intravenous Q2H PRN    [COMPLETED] ceFAZolin (Ancef) 2g in 10mL IV syringe premix  2 g Intravenous On Call to OR   [2]   Medications Prior to Admission   Medication Sig Dispense Refill Last Dose/Taking    Omega-3 Fatty Acids (FISH OIL OR) Take 2 capsules by mouth nightly.   4/16/2025 Evening    MAGNESIUM OR Take 2 tablets by mouth nightly.   4/16/2025 Evening    ondansetron 4 MG Oral Tablet Dispersible Take 1 tablet (4 mg total) by mouth every 4 (four) hours as needed for Nausea. 10 tablet 0 4/17/2025 Morning    [DISCONTINUED] HYDROcodone-acetaminophen 5-325 MG Oral Tab Take 1-2 tablets by mouth every 6 (six) hours as needed. 10 tablet 0 4/17/2025 Morning    tamsulosin 0.4 MG Oral Cap Take 1 capsule (0.4 mg total) by mouth After dinner. Take 1/2 hour following the same meal each day 30 capsule 0    [3]   Past Surgical History:  Procedure Laterality Date    Colonoscopy N/A 8/18/2021    Procedure: COLONOSCOPY, POSSIBLE BIOPSY, POSSIBLE POLYPECTOMY;  Surgeon: Isaac Juan DO;  Location: [facility]   [4]   Social History  Socioeconomic History    Marital status:    Tobacco Use    Smoking status: Never     Passive exposure: Never    Smokeless tobacco: Never   Substance and Sexual Activity    Alcohol use: Yes     Comment: once weekly    Drug use: No

## 2025-04-18 NOTE — DISCHARGE INSTRUCTIONS
My office will call you to set up an appointment in 7 to 10 days to remove your stent.      Home Care Instructions  CYSTOSCOPY  Dr    Operative Site: Slight Burning on urination may be experienced with the first few urinations and scant blood may appear in the urine and will clear in a few days. Drinking water and clear liquids is encouraged.    Local Anesthesia:Resume you normal diet and activity as tolerated avoiding stress to the operative site. Caffeine, alcohol, citrus and spicy foods may worsen burning or urgency.    General Anesthesia / Local with Sedation:The sedation stays in your body about 24 hours. You may experience headache, soreness, aching, nausea, vomiting, dizziness and/or tiredness. Sore throat and hoarseness can be present after general anesthesia only. Drink liquids and eat light foods. Advance to your regular diet as tolerated. Remain on liquids only if nausea or vomiting is present. NO ALCHOHOLIC BEVERAGES for 24 hours. For the first 24 hours rest and move cautiously, do not drive, operate equipment or make any personal or legal decisions.    Catheter: (if you have one) Your catheter remains in place because a balloon close to the catheter tip (that is in your bladder) was inflated with fluid immediately after the catheter was inserted. If you have been instructed to remove the catheter at home by your doctor, cut the catheter at the short Y. After all the water is drained, gently pull the catheter out. If you have any questions or if you feel pain or resistance when removing the catheter, STOP and call your doctor. After removing the catheter, it is normal to experience some stinging or burning with urination.    Medications: If prescription medication is ordered take as directed. Do not take on an empty stomach. Do not drive or operated equipment. Do not drink alcohol.    Call your doctor for:   Difficulty in urination, inability to urinate, excessive bleeding/discoloration   Severe pain not  controlled by pain medication   Persistent nausea and vomiting   Skin rash and general body itching   Other concerns or questions not addressed in these instructions    Follow any additional instructions given by the surgeon.  IN CASE OF EMERGENCY GO TO THE NEAREST EMERGENCY ROOM  Call the office for an appointment in 2-4 weeks. [phone]      Teresa DOZIER RN    4/18/2025    [facility]  [phone]

## 2025-04-18 NOTE — OPERATIVE REPORT
[facility]   part of [facility]    Operative Note     Aquilino Lynn II Location: OR   [facility] [id number] MRN [id number]   Admission Date 4/17/2025 Operation Date 4/18/2025   Attending Physician Priscila Monroy MD Operating Physician Stefany Rodgers MD      Preoperative Diagnosis: left Ureteral stone [N20.1]     Postoperative Diagnosis: left Ureteral stone [N20.1]     Procedure Performed:   CYSTOSCOPY, LEFT RETROGRADE PYELOGRAM, LEFT URETEROSCOPY, LASER LITHOTRIPSY, STONE EXTRACTION, LEFT URETERAL STENT PLACEMENT     Primary Surgeon: Stefany Rodgers MD      Assistant: none     Surgical Findings: Left proximal ureteral stone approximately 9 mm in size which on wire placement was pushed up into an upper pole calyx.  Stone was fragmented and dusted into fragments about 2 to 3 mm in size which were then removed using a nitinol tipless stone basket.     Anesthesia: General     Complications: None     Implants:   Implant Name Type Inv. Item Serial No.  Lot No. LRB No. Used Action   SET URET STNT 6FR L28CM AXXCESS CATH 6FR SFT - SNA  SET URET STNT 6FR L28CM AXXCESS CATH 6FR SFT NA [email or web address] [id number] Left 1 Implanted        Specimen: Stone fragments submitted for chemical analysis     Drains: None     Condition: Stable     Estimated Blood Loss: No data recorded     Summary of Case: After consent was obtained and preoperative antibiotics administered the patient was brought into the operating room.  Anesthesia was administered and the patient was placed in the dorsolithotomy position.  The groin was prepped and draped in the usual sterile standard fashion.  22 Vietnamese rigid cystoscope was placed per urethra.  A wire was advanced on the left side to the collecting system.  The stone could not be easily seen on intraoperative fluoroscopy.  Retrograde pyelogram could not be done as the patient had mild to moderate stenosis of the ureteral orifice which is likely physiologic/congenital.    I placed  a 6 [place] open-ended catheter over the wire and met some resistance in the proximal left ureter suggestive of the location of the stone.  An 8/10 coaxial dilator was used to further dilate the ureter and introduce a second safety wire.  Over one of the wires I attempted to place the lateral view [employer] disposable digital ureteroscope but met resistance with the ureteral orifice.  An 11/13 Andorran by 36 cm access sheath was advanced to the mid left ureter.  I was then easily able to place the ureteroscope to the collecting system.  In the midpole to upper pole calyx there was a stone.  This was positioned to an upper pole calyx.  Using a 200 µm flex TIVA laser fiber I fragmented the stone and attempted to dusted although without the Rogelio mode but is not dusting very appropriately.  Once I broke the stone into 5 or 6 pieces there were 1 to 2 mm each I remove them using a tipless stone basket.  Those were submitted for chemical analysis.  No additional stones could be seen of larger than 1 mm caliber size.  The ureteroscope and the access sheath were removed in tandem leaving the safety wire in place.  The wire was backloaded onto the rigid cystoscope and I placed a 6 Andorran by 28 cm stent under fluoroscopic and cystoscopic guidance.  The patient tolerated the procedure well.  There were no perioperative or immediate postoperative complications.  I was present performed the entirety of his procedure.     Stefany Rodgers MD  4/18/2025  2:38 PM

## 2025-04-18 NOTE — PROGRESS NOTES
[facility]   part of [facility]    Progress Note    Aquilino Lynn II Patient Status:  Inpatient    1970 MRN [id number]   Location [facility] 3SW-A Attending Priscila Monroy MD   Hosp Day # 0 PCP Yobani Dietz DO        Subjective:   Pt is resting in bed. Afebrile, VSS. States pain is still LLQ, however has move down and slightly more midline. Voiding freely, straining urine. Has not passed stone. No c/o chest pain or difficulty breathing.     Cr 1.10 -> this mornings BMP still pending  WBC 7.3 -> 6.8    UA showed 3-5 RBC, no evidence of infection    CT showed 9 mm obstructing stone in the proximal left ureter positioned at the approximate L2-3 level resulting in mild left hydronephrosis and mild left perinephric stranding. Urinary bladder wall thickening with fatty induration is concerning for cystitis.  Clinical correlation recommended. The hypodense lesion in the medial left hepatic lobe with higher than fluid attenuation measuring up to 2.1 cm is incompletely characterized on the basis of this exam.  Dedicated abdominal MRI with without contrast could be performed for further evaluation as clinically directed. Uncomplicated colonic diverticulosis.           Objective:   Vital Signs:  Blood pressure 112/72, pulse 67, temperature 97.8 °F (36.6 °C), temperature source Oral, resp. rate 18, weight 178 lb 9.2 oz (81 kg), SpO2 94%.     General: Alert and oriented. No acute distress.  Eyes: Sclera non-icteric.  Neck: No obvious masses or goiter.  CV/Resp: nonlabored breathing  Abdomen: Soft, ND, LLQ tenderness  Extremities: warm, well perfused, no clubbing cyanosis or edema          Results:     Lab Results   Component Value Date    WBC 7.3 2025    HGB 15.2 2025    HCT 43.2 2025    .0 2025    CREATSERUM 1.10 2025    BUN 13 2025     2025    K 4.0 2025     2025    CO2 28.0 2025     (H) 2025    CA 9.6  04/17/2025    ALB 4.2 04/17/2025    ALKPHO 46 04/17/2025    BILT 1.0 04/17/2025    TP 6.9 04/17/2025    AST 16 04/17/2025    ALT 16 04/17/2025    T4F 1.2 10/19/2018    TSH 0.68 10/19/2018    LIP 55 (H) 04/16/2025       CT ABDOMEN+PELVIS KIDNEYSTONE 2D RNDR(NO IV,NO ORAL)(CPT=74176)  Result Date: 4/16/2025  CONCLUSION:   1. 9 mm obstructing stone in the proximal left ureter positioned at the approximate L2-3 level resulting in mild left hydronephrosis and mild left perinephric stranding.  2. Urinary bladder wall thickening with fatty induration is concerning for cystitis.  Clinical correlation recommended.  3. The hypodense lesion in the medial left hepatic lobe with higher than fluid attenuation measuring up to 2.1 cm is incompletely characterized on the basis of this exam.  Dedicated abdominal MRI with without contrast could be performed for further evaluation as clinically directed.  4. Uncomplicated colonic diverticulosis.     Please see above for further details.  A preliminary report was provided by the [facility] teleradiology service.  LOCATION:  [facility]   Dictated by (CST): Darci Covarrubias MD on 4/16/2025 at 6:01 AM     Finalized by (CST): Darci Covarrubias MD on 4/16/2025 at 6:04 AM                       Assessment and Plan:     Pt is a 54 year old male with hx of melanoma, who returned to the ED this morning for intractable pain after being diagnosed with obstructing 9 mm proximal left ureteral stone yesterday after presenting to the ED for left flank pain. Patient afebrile and hemodynamically stable. Labs with normal white count and serum creatinine. UA 3-5 RBC only. No formal culture sent.     Recommendations:  Continue IVF, pain medication prn. Strain all urine.  NPO for ureteroscopy today, 4/18/25, with Dr. Stefany Rodgers.       All questions answered. Patient verbalizes understanding and agrees with plan.    PRIMITIVO Durand  Urology Department  [facility]  Office: [phone]

## 2025-04-18 NOTE — ANESTHESIA PROCEDURE NOTES
Airway  Date/Time: 4/18/2025 1:42 PM  Reason: elective      General Information and Staff   Patient location during procedure: OR  Anesthesiologist: Murphy Gamez MD  Performed: anesthesiologist   Performed by: Murphy Gamze MD  Authorized by: Murphy Gamez MD        Indications and Patient Condition  Indications for airway management: anesthesia  Sedation level: deep      Preoxygenated: yesPatient position: sniffing    Mask difficulty assessment: 0 - not attempted    Final Airway Details    Final airway type: supraglottic airway      Successful airway: classic  Size: 4     Number of attempts at approach: 1

## 2025-04-18 NOTE — PROGRESS NOTES
[facility]   part of [facility]     Hospitalist Progress Note     Aquilino Lynn II Patient Status:  Inpatient    1970 MRN [id number]   Location [facility] 3SW-A Attending Priscila Monroy MD   Hosp Day # 1 PCP Yobani Dietz DO     Chief Complaint: Kidney stone     Subjective:     Patient pain better controlled.     Objective:    Review of Systems:   A comprehensive review of systems was completed; pertinent positive and negatives stated in subjective.    Vital signs:  Temp:  [97.3 °F (36.3 °C)-98.3 °F (36.8 °C)] 97.6 °F (36.4 °C)  Pulse:  [52-70] 55  Resp:  [15-18] 18  BP: (108-126)/(55-84) 109/64  SpO2:  [90 %-96 %] 95 %    Physical Exam:    General: No acute distress  Respiratory: No wheezes, no rhonchi  Cardiovascular: S1, S2, regular rate and rhythm  Abdomen: Soft, Non-tender, non-distended, positive bowel sounds  Neuro: No new focal deficits.   Extremities: No edema      Diagnostic Data:    Labs:  Recent Labs   Lab 25  0310 25  0731 25  0517   WBC 5.1 7.3 6.8   HGB 15.7 15.2 11.8*   MCV 87.1 89.4 90.5   .0 234.0 182.0       Recent Labs   Lab 25  0310 25  0731 25  0517   * 121* 115*   BUN 16 13 11   CREATSERUM 1.17 1.10 1.17   CA 9.4 9.6 8.5*   ALB 4.3 4.2  --     143 142   K 4.0 4.0 4.1    109 108   CO2 21.0 28.0 23.0   ALKPHO 46 46  --    AST 17 16  --    ALT 17 16  --    BILT 0.9 1.0  --    TP 7.1 6.9  --        Estimated Glomerular Filtration Rate: 74 mL/min/1.73m2 (result from lab).    No results for input(s): \"TROP\", \"TROPHS\", \"CK\" in the last 168 hours.    No results for input(s): \"PTP\", \"INR\" in the last 168 hours.               Microbiology    No results found for this visit on 25.      Imaging: Reviewed in Epic.    Medications: Scheduled Medications[1]    Assessment & Plan:      #Flank pain due to nephrolithiasis  Pain control  IVF  Anti emetics  Uro on Cs- NPO at [facility] plan for cysto on      #Suspected urine  retention due to above   Bladder scan     Possible DC today after cysto  Priscila Monroy MD    Supplementary Documentation:     Quality:  DVT Mechanical Prophylaxis:   SCDs,    DVT Pharmacologic Prophylaxis   Medication    enoxaparin (Lovenox) 40 MG/0.4ML SUBQ injection 40 mg                Code Status: Full Code  Montoya: No urinary catheter in place  Montoya Duration (in days):   Central line:    TAYLOR:     Discharge is dependent on: Uro   At this point Mr. Lynn is expected to be discharge to: home    The 21st Century Cures Act makes medical notes like these available to patients in the interest of transparency. Please be advised this is a medical document. Medical documents are intended to carry relevant information, facts as evident, and the clinical opinion of the practitioner. The medical note is intended as peer to peer communication and may appear blunt or direct. It is written in medical language and may contain abbreviations or verbiage that are unfamiliar.                         [1]    docusate sodium  100 mg Oral Once    enoxaparin  40 mg Subcutaneous Daily    tamsulosin  0.4 mg Oral After dinner    ceFAZolin  2 g Intravenous On Call to OR

## 2025-04-21 ENCOUNTER — PATIENT OUTREACH (OUTPATIENT)
Dept: CASE MANAGEMENT | Age: 55
End: 2025-04-21

## 2025-04-21 NOTE — PAYOR COMM NOTE
RECONSIDERATION REQUEST FOR INPATIENT SERVICES      --------------  ADMISSION REVIEW     Payor: KAYLENE PRUITT  Subscriber #:  [id number]  Authorization Number: [id number]    Admit date: 4/17/25  Admit time:  9:49 AM       REVIEW DOCUMENTATION:     ED Provider Notes        ED Provider Notes signed by Agnes Kemp MD at 4/17/2025  8:59 AM      Patient Seen in: [facility] Emergency Department      History     Chief Complaint   Patient presents with    Abdomen/Flank Pain    Nausea/Vomiting/Diarrhea     Here yesterday has 9mm stone     Stated Complaint: kidney stone    Subjective:   HPI    54-year-old male presents with worsening pain and vomiting after being diagnosed with a kidney stone yesterday.  Patient states left flank pain started about 2 days ago.  He was seen at this ED early yesterday morning and diagnosed with a kidney stone.  Wife states they followed up with the urologist yesterday afternoon was going to schedule him for procedure to remove the stone.  Patient states he vomited twice last night and again this morning was unable to tolerate pain medication.  He also reports some difficulty urinating this morning.  Denies fever.    History of Present Illness               Objective:     Past Medical History:    Cancer (HCC)     Physical Exam     ED Triage Vitals [04/17/25 0636]   /77   Pulse 65   Resp 16   Temp 97.3 °F (36.3 °C)   Temp src Temporal   SpO2 99 %   O2 Device None (Room air)       Current Vitals:   Vital Signs  BP: 132/90  Pulse: 57  Resp: 22  Temp: 97.3 °F (36.3 °C)  Temp src: Temporal  MAP (mmHg): (!) 104    Oxygen Therapy  SpO2: 97 %  O2 Device: None (Room air)        Physical Exam  Vitals and nursing note reviewed.   Constitutional:       General: He is not in acute distress.     Appearance: He is well-developed. He is not ill-appearing.   HENT:      Head: Normocephalic and atraumatic.      Mouth/Throat:      Mouth: Mucous membranes are moist.   Eyes:      General: No scleral  icterus.     Extraocular Movements: Extraocular movements intact.   Cardiovascular:      Rate and Rhythm: Normal rate and regular rhythm.   Pulmonary:      Effort: Pulmonary effort is normal.      Breath sounds: Normal breath sounds.   Abdominal:      General: Bowel sounds are normal. There is no distension.      Palpations: Abdomen is soft.      Tenderness: There is abdominal tenderness. There is no guarding.      Comments: Mild left upper quadrant and left CVA tenderness   Musculoskeletal:      Cervical back: Neck supple.   Skin:     General: Skin is warm and dry.      Capillary Refill: Capillary refill takes less than 2 seconds.   Neurological:      Mental Status: He is alert and oriented to person, place, and time.      GCS: GCS eye subscore is 4. GCS verbal subscore is 5. GCS motor subscore is 6.   Psychiatric:         Mood and Affect: Mood normal.         Behavior: Behavior normal.       ED Course     Labs Reviewed   COMP METABOLIC PANEL (14) - Abnormal; Notable for the following components:       Result Value    Glucose 121 (*)     Calculated Osmolality 297 (*)     All other components within normal limits   CBC WITH DIFFERENTIAL WITH PLATELET   URINALYSIS WITH CULTURE REFLEX   RAINBOW DRAW BLUE   RAINBOW DRAW GOLD     MDM      54-year-old male presents with worsening pain and vomiting after being diagnosed with a kidney stone yesterday.    Chart review from ED visit yesterday shows patient presented with left flank pain.  Patient had CT abdomen/pelvis that showed a 9 mm stone in the proximal left ureter with mild hydroperinephric stranding.  UA without evidence UTI.  Renal function was normal.  Patient discharged home with Rx for Norco and Zofran.      Differential includes but is not limited to renal colic, dehydration, electrolyte abnormality, UTI    Labs show normal WBC, hemoglobin, electrolytes and renal function.  Urinalysis pending.    Patient treated with morphine, Toradol, Zofran and IV fluids with  improvement in pain.    Discussed with urology APN, recommends admission to hospitalist and keep patient n.p.o. for likely procedure today.  Discussed with hospitalist Dr. Monroy.      Admission disposition: 4/17/2025  8:55 AM        Medical Decision Making  Amount and/or Complexity of Data Reviewed  Independent Historian: spouse  External Data Reviewed: labs, radiology and notes.     Details: See MDM  Labs: ordered. Decision-making details documented in ED Course.  Radiology: ordered and independent interpretation performed. Decision-making details documented in ED Course.  Discussion of management or test interpretation with external provider(s): Neurology, hospitalist    Risk  Parenteral controlled substances.  Decision regarding hospitalization.        Disposition and Plan     Clinical Impression:  1. Calculus of proximal left ureter    2. Nausea and vomiting in adult         Disposition:  Admit  4/17/2025  8:55 am     Hospital Problems       Present on Admission  Date Reviewed: 4/16/2025          ICD-10-CM Noted POA    * (Principal) Calculus of proximal left ureter N20.1 4/17/2025 Unknown                 4/17 H&P    Chief Complaint: Flank pain      Subjective:  History of Present Illness:      Aquilino Lynn II is a 54 year old male with  h/p nephrolithiasis . Pt was in ER 4/16 with flank pain due to nephrolithiasis. HE returns to the hospital with severe flank pain, nausea.     #Flank pain due to nephrolithiasis  Pain control  IVF  Anti emetics  Uro on Cs- NPO at [place] plan for cysto on 4/18 4/17 URO    Reason for Consultation:  Intractable pain/vomiting secondary to ureteral stone     History of Present Illness:  Aquilino Lynn II is a a(n) 54 year old male with hx of melanoma, who returned to the ED this morning for intractable pain after being diagnosed with obstructing 9 mm proximal left ureteral stone yesterday after presenting to the ED for left flank pain. Patient afebrile and  hemodynamically stable. Labs with normal white count and serum creatinine. UA pending, but UA from yesterday >10 RBC/hpf only. No formal culture sent. Patient being admitted for further evaluation and mgmt. Urology consulted.      Patient seen yesterday by KIRK Esparza and plan for ureteroscopy, which had yet to be scheduled. Patient notes pain worsened overnight with inability to tolerate PO, so he returned to the ED. No fevers. No voiding complaints.      Prior stone ~20 years, passed spontaneously. No prior treatment.       54 year old male with hx of melanoma, who returned to the ED this morning for intractable pain after being diagnosed with obstructing 9 mm proximal left ureteral stone yesterday after presenting to the ED for left flank pain. Patient afebrile and hemodynamically stable. Labs with normal white count and serum creatinine. UA pending, but UA from yesterday >10 RBC/hpf only. No formal culture sent. Patient being admitted for further evaluation and mgmt. Urology consulted.      Reviewed imaging with patient, no additional stones noted.  Surgical risks, benefits and alternatives discussed.    Risks of ureteroscopy including but not limited to infection, bleeding, anesthetic issues, possible need for stent, need for subsequent removal of stent, ureteral spasm, ureteral injury or stricture discussed with patient.      Recommendations:  Continue IVF, pain medication prn. Strain all urine.  NPO at midnight for ureteroscopy tomorrow, 4/18/25, with Dr. Stefany Rodgers.          4/18 Urology   States pain is still LLQ, however has move down and slightly more midline. Voiding freely, straining urine. Has not passed stone. No c/o chest pain or difficulty breathing.      Cr 1.10 -> this mornings BMP still pending  WBC 7.3 -> 6.8     UA showed 3-5 RBC, no evidence of infection     CT showed 9 mm obstructing stone in the proximal left ureter positioned at the approximate L2-3 level resulting in mild left  hydronephrosis and mild left perinephric stranding. Urinary bladder wall thickening with fatty induration is concerning for cystitis.  Clinical correlation recommended. The hypodense lesion in the medial left hepatic lobe with higher than fluid attenuation measuring up to 2.1 cm is incompletely characterized on the basis of this exam.  Dedicated abdominal MRI with without contrast could be performed for further evaluation as clinically directed. Uncomplicated colonic diverticulosis.       Objective:     Blood pressure 112/72, pulse 67, temperature 97.8 °F (36.6 °C), temperature source Oral, resp. rate 18, weight 178 lb 9.2 oz (81 kg), SpO2 94%.     Abdomen: Soft, ND, LLQ tenderness       Pt is a 54 year old male with hx of melanoma, who returned to the ED this morning for intractable pain after being diagnosed with obstructing 9 mm proximal left ureteral stone yesterday after presenting to the ED for left flank pain. Patient afebrile and hemodynamically stable. Labs with normal white count and serum creatinine. UA 3-5 RBC only. No formal culture sent.      Recommendations:  Continue IVF, pain medication prn. Strain all urine.  NPO for ureteroscopy today, 4/18/25, with Dr. Stefany Rodgers.           4/18    Preoperative Diagnosis: left Ureteral stone [N20.1]     Postoperative Diagnosis: left Ureteral stone [N20.1]     Procedure Performed:   CYSTOSCOPY, LEFT RETROGRADE PYELOGRAM, LEFT URETEROSCOPY, LASER LITHOTRIPSY, STONE EXTRACTION, LEFT URETERAL STENT PLACEMENT     Primary Surgeon: Stefany Rodgers MD      Assistant: none     Surgical Findings: Left proximal ureteral stone approximately 9 mm in size which on wire placement was pushed up into an upper pole calyx.  Stone was fragmented and dusted into fragments about 2 to 3 mm in size which were then removed using a nitinol tipless stone basket.     Anesthesia: General     Complications: None     Implants:   Implant Name Type Inv. Item Serial No.  Lot No. LRB  No. Used Action   SET URET STNT 6FR L28CM AXXCESS CATH 6FR SFT - SNA   SET URET STNT 6FR L28CM AXXCESS CATH 6FR SFT NA [employer] WD [id number] Left 1 Implanted         Specimen: Stone fragments submitted for chemical analysis     Drains: None     Condition: Stable     Estimated Blood Loss: No data recorded            Medications 04/16/25 04/17/25 04/18/25   ceFAZolin (Ancef) 2g in 10mL IV syringe premix  Dose: 2 g  Freq: On call to O.R. Route: IV  Start: 04/18/25 0600 End: 04/18/25 1343   Order specific questions:         1343 ES-Given          ketorolac (Toradol) 15 MG/ML injection 15 mg  Dose: 15 mg  Freq: Once Route: IV  Start: 04/17/25 0720 End: 04/17/25 0725 0725 DANIELA-Given           magnesium oxide (Mag-Ox) tab 400 mg  Dose: 400 mg  Freq: Once Route: OR  Start: 04/18/25 1545 End: 04/18/25 1852      (1545 TS)-Not Given   1852-D/C'd       morphINE PF 4 MG/ML injection 4 mg  Dose: 4 mg  Freq: Once Route: IV  Start: 04/17/25 0933 End: 04/17/25 0936     0936 DANIELA-Given           morphINE PF 4 MG/ML injection 4 mg  Dose: 4 mg  Freq: Once Route: IV  Start: 04/17/25 0720 End: 04/17/25 0725 0725 DANIELA-Given           ondansetron (Zofran) 4 MG/2ML injection 4 mg  Dose: 4 mg  Freq: Once Route: IV  Start: 04/17/25 0646 End: 04/17/25 0721   Admin Instructions:   For Nausea and Vomiting     0721 DANIELA-Given           sodium chloride 0.9 % IV bolus 1,000 mL  Dose: 1,000 mL  Freq: Once Route: IV  Last Dose: Stopped (04/17/25 0927)  Start: 04/17/25 0800 End: 04/17/25 0927     0819 JR-New Bag   0927 DANIELA-Stopped          sodium chloride 0.9 % IV bolus 1,000 mL  Dose: 1,000 mL  Freq: Once Route: IV  Last Dose: Stopped (04/17/25 0759)  Start: 04/17/25 0646 End: 04/17/25 0759     0720 DANIELA-New Bag   0759 DANIELA-Stopped          tamsulosin (Flomax) cap 0.4 mg  Dose: 0.4 mg  Freq: After dinner Route: OR  Start: 04/17/25 1800 End: 04/18/25 1535 1038 TS-Given        1331 UE-MAR Hold   1535 KB-MAR Unhold   1535-D/C'd           sodium chloride 0.9% infusion  Rate: 125 mL/hr  Freq: Continuous Route: IV  Start: 04/17/25 1000 End: 04/18/25 1535    1034 SB-New Bag   1708 TS-New Bag       0301 JS-New Bag   1025 TS-Rate/Dose Change   1336 ES-Continued by Anesthesia     1500 KK-Rate/Dose Change   1535-D/C'd              acetaminophen (Tylenol Extra Strength) tab 1,000 mg  Dose: 1,000 mg  Freq: Every 4 hours PRN Route: OR  PRN Reasons: mild pain,Fever  PRN Comment: equal to or greater than 100.4  Start: 04/17/25 0956 End: 04/18/25 1852   Admin Instructions:   do not initiate oral therapy until 6-8 hours after the last IV acetaminophen dose if IV acetaminophen was used previously      1331 UE-MAR Hold   1535 TS-MAR Unhold   1602 TS-Given     1852-D/C'd            Or   HYDROmorphone (Dilaudid) 1 MG/ML injection 0.4 mg  Dose: 0.4 mg  Freq: Every 2 hour PRN Route: IV  PRN Reason: severe pain  Start: 04/17/25 0956 End: 04/17/25 1043   Admin Instructions:   Use PRN reason as a guide and follow range order policy. If oral pain meds are ordered and patient can tolerate oral intake, start with PRN oral pain medications first.     1027 SB-Given   1043-D/C'd           ondansetron (Zofran) 4 MG/2ML injection 4 mg  Dose: 4 mg  Freq: Every 6 hours PRN Route: IV  PRN Reasons: Nausea,vomiting  Start: 04/17/25 0956 End: 04/18/25 1852   Admin Instructions:   Default antiemetic sequence (unless otherwise preferred by patient):  1. ondansetron (Zofran)  2. prochlorperazine (Compazine). Wait 15 minutes before proceeding to next medication in sequence.  Follow therapeutic duplication policy.     1749 TS-Given        1331 UE-MAR Hold   1535 TS-MAR Unhold   1852-D/C'd          prochlorperazine (Compazine) 10 MG/2ML injection 5 mg  Dose: 5 mg  Freq: Every 8 hours PRN Route: IV  PRN Reasons: Nausea,vomiting  Start: 04/17/25 0956 End: 04/18/25 9062   Admin Instructions:   Default antiemetic sequence (unless otherwise preferred by patient):  1. ondansetron (Zofran) 2.  prochlorperazine (Compazine). Wait 15 minutes before proceeding to next medication in sequence.  Follow therapeutic duplication policy.     1313 AS-Given        1331 UE-MAR Hold   1535 TS-MAR Unhold   1852-D/C'd          Vitals (last day) before discharge       Date/Time Temp Pulse Resp BP SpO2 Weight O2 Device O2 Flow Rate (L/min) [facility]    04/18/25 1556 97.6 °F (36.4 °C) 60 18 128/60 -- -- None (Room air) --     04/18/25 1520 98.9 °F (37.2 °C) 69 18 103/61 97 % -- None (Room air) --     04/18/25 1510 -- 67 14 -- 96 % -- -- --     04/18/25 1505 -- 67 9 110/82 -- -- -- --     04/18/25 1505 -- -- -- -- 94 % -- None (Room air) --     04/18/25 1500 -- 67 12 103/80 100 % -- None (Room air) --     04/18/25 1455 -- 70 14 113/77 97 % -- -- --     04/18/25 1450 98.1 °F (36.7 °C) 82 14 120/77 92 % -- Simple mask 8 L/min     04/18/25 1136 97.6 °F (36.4 °C) 55 18 109/64 95 % -- None (Room air) --     04/18/25 0813 97.3 °F (36.3 °C) 53 18 108/65 96 % -- None (Room air) --     04/18/25 0446 97.7 °F (36.5 °C) 52 18 113/55 90 % -- None (Room air) 0 L/min     04/18/25 0000 98.3 °F (36.8 °C) 56 18 114/64 94 % -- None (Room air) 0 L/min     04/17/25 2100 -- 70 -- -- 91 % -- -- --     04/17/25 2000 97.8 °F (36.6 °C) 67 18 112/72 94 % -- None (Room air) 0 L/min     04/17/25 1704 97.7 °F (36.5 °C) 53 15 126/84 95 % -- None (Room air) -- LW    04/17/25 1220 97.9 °F (36.6 °C) 52 16 114/79 93 % -- None (Room air) -- SB    04/17/25 1030 -- -- -- -- -- 178 lb 9.2 oz (81 kg) -- -- SB    04/17/25 0956 97.9 °F (36.6 °C) 53 17 123/87 95 % -- None (Room air) -- AS    04/17/25 0930 -- 59 18 121/93 100 % -- None (Room air) -- DANIELA    04/17/25 0900 -- 51 14 107/73 100 % -- None (Room air) -- DANIELA    04/17/25 0830 -- 57 22 -- 97 % -- None (Room air) -- DANIELA    04/17/25 0738 -- -- -- -- -- -- None (Room air) -- DANIELA    04/17/25 0730 -- 57 18 132/90 100 % -- None (Room air) -- DANIELA    04/17/25 0636 97.3 °F (36.3 °C) 65 16 128/77 99 % 178  lb 9.2 oz (81 kg) None (Room air) -- CK             --------------  DISCHARGE REVIEW    Payor: KAYLENE PRUITT  Subscriber #:  [id number]  Authorization Number: [id number]    Admit date: 25  Admit time:   9:49 AM  Discharge Date: 2025  4:51 PM     Admitting Physician: Priscila Monroy MD  Attending Physician:  No att. providers found  Primary Care Physician: Yobani Dietz DO          Discharge Summary Notes        Discharge Summary signed by Priscila Monroy MD at 2025  2:31 PM       Author: Priscila Monroy MD Specialty: HOSPITALIST, Internal Medicine Author Type: Physician    Filed: 2025  2:31 PM Date of Service: 2025 12:07 PM Status: Signed    : Priscila Monroy MD (Physician)           [facility]  DISCHARGE SUMMARY     Aquilino Lynn II Patient Status:  Inpatient    1970 MRN [id number]   Location [facility] 3SW-A Attending Priscila Monroy MD   Hosp Day # 1 PCP Yobani Dietz DO     Date of Admission: 2025  Date of Discharge:   2015    Discharge Disposition: Home or Self Care    Discharge Diagnosis:  #Flank pain due to nephrolithiasis  Pain control  IVF  Anti emetics  Uro on Cs- NPO at [place] plan for cysto on      History of Present Illness:   Aquilino Lynn II is a 54 year old male with  h/p nephrolithiasis . Pt was in ER  with flank pain due to nephrolithiasis. HE returns to the hospital with severe flank pain, nausea.     Brief Synopsis:   The patient was admitted and urology consulted. Pt underwent cysto, lithotripsy , stone extraction and left sided ureteral stent insertion. DC home in stable condition.     Lace+ Score: 57  59-90 High Risk  29-58 Medium Risk  0-28   Low Risk       TCM Follow-Up Recommendation:  LACE > 58: High Risk of readmission after discharge from the hospital.      Procedures during hospitalization:   As above    Incidental or significant findings and recommendations (brief descriptions):  no    Lab/Test results pending at  Discharge:   no    Consultants:  URO     Discharge Medication List:     Discharge Medications        CONTINUE taking these medications        Instructions Prescription details   FISH OIL OR      Take 2 capsules by mouth nightly.   Refills: 0     HYDROcodone-acetaminophen 5-325 MG Tabs  Commonly known as: Norco      Take 1-2 tablets by mouth every 6 (six) hours as needed.   Quantity: 12 tablet  Refills: 0     MAGNESIUM OR      Take 2 tablets by mouth nightly.   Refills: 0     ondansetron 4 MG Tbdp  Commonly known as: Zofran-ODT      Take 1 tablet (4 mg total) by mouth every 4 (four) hours as needed for Nausea.   Stop taking on: April 23, 2025  Quantity: 10 tablet  Refills: 0     tamsulosin 0.4 MG Caps  Commonly known as: Flomax      Take 1 capsule (0.4 mg total) by mouth After dinner. Take 1/2 hour following the same meal each day   Stop taking on: May 16, 2025  Quantity: 30 capsule  Refills: 0               Where to Get Your Medications        These medications were sent to [id number] - [place], [place] - [address] [phone], [phone]  [address] [address]      Phone: [phone]   HYDROcodone-acetaminophen 5-325 MG Tabs         [place] reviewed[place] n/a     Follow-up appointment:   Stefany Rodgers MD  [address] [id number] [phone]    Follow up      Appointments for Next 30 Days 4/18/2025 - 5/18/2025      None            Vital signs:  Temp:  [97.3 °F (36.3 °C)-98.3 °F (36.8 °C)] 97.6 °F (36.4 °C)  Pulse:  [52-70] 55  Resp:  [15-18] 18  BP: (108-126)/(55-84) 109/64  SpO2:  [90 %-96 %] 95 %    Physical Exam:    General: No acute distress   Lungs: clear to auscultation  Cardiovascular: S1, S2  Abdomen: Soft      -----------------------------------------------------------------------------------------------  PATIENT DISCHARGE INSTRUCTIONS: See electronic chart    Priscila Monroy MD    Total time spent on discharge planning:  <30 minutes     The 21st Century  Cures Act makes medical notes like these available to patients in the interest of transparency. Please be advised this is a medical document. Medical documents are intended to carry relevant information, facts as evident, and the clinical opinion of the practitioner. The medical note is intended as peer to peer communication and may appear blunt or direct. It is written in medical language and may contain abbreviations or verbiage that are unfamiliar.       Electronically signed by Priscila Monroy MD on 4/19/2025  2:31 PM         REVIEWER COMMENTS

## 2025-04-21 NOTE — TELEPHONE ENCOUNTER
Spoke with patient, assisted in scheduling, cystoscopy with stent removal. Patient aware to arrive at 1230 pm. Will also send patient information about procedure on my chart.     Future Appointments   Date Time Provider Department Center   4/28/2025  1:00 PM Stefany Rodgers MD [facility]

## 2025-04-21 NOTE — PROGRESS NOTES
TCM Request  Hospital Follow up for  PCP (Discharge 4/18 EDW)     PCP   Yobani Dietz  [address] [address]  [phone]  Attempt #1:  Left message on voicemail for patient to call transitions specialist back to schedule follow up appointments. Provided Transitions specialist scheduling phone number [phone].

## 2025-04-22 NOTE — PROGRESS NOTES
TCM Request  Hospital Follow up for  PCP (Discharge 4/18 EDW)      PCP   Yobani Dietz  [address] [address]  [phone]        Attempt #2:  Left message on voicemail for patient to call transitions specialist back to schedule follow up appointments. Provided Transitions specialist scheduling phone number [phone].

## 2025-04-23 NOTE — PROGRESS NOTES
TCM Request  Hospital Follow up for  PCP (Discharge 4/18 EDW)      PCP   Yobani Dietz  [place] [address] [place] [address]  [phone]  DECLINED - pt states that he has his follow-up appts scheduled with his cancer team      Closing encounter

## 2025-04-28 ENCOUNTER — PROCEDURE (OUTPATIENT)
Dept: SURGERY | Facility: CLINIC | Age: 55
End: 2025-04-28
Payer: COMMERCIAL

## 2025-04-28 VITALS
DIASTOLIC BLOOD PRESSURE: 68 MMHG | SYSTOLIC BLOOD PRESSURE: 108 MMHG | HEART RATE: 62 BPM | WEIGHT: 180 LBS | HEIGHT: 72 IN | BODY MASS INDEX: 24.38 KG/M2

## 2025-04-28 DIAGNOSIS — N20.0 KIDNEY STONES: Primary | ICD-10-CM

## 2025-04-28 PROCEDURE — 52310 CYSTOSCOPY AND TREATMENT: CPT | Performed by: UROLOGY

## 2025-04-28 NOTE — PROGRESS NOTES
Aquilino Lynn II is a 54 year old male.    HPI:     Chief Complaint   Patient presents with    Kidney Problem     Cystoscopy Stent removal         54-year-old male status post cystoscopy, left ureteroscopy laser lithotripsy and stent placement for a 9 mm proximal left ureteral stone performed 2025 at [facility].  He presents today for stent removal.  No issues or concerns.  This is his second episode of stones the first being 20 years ago which he states he passed on his own.    HISTORY:  Past Medical History[1]   Past Surgical History[2]   Family History[3]   Social History: Short Social Hx on File[4]     Medications (Active prior to today's visit):  Current Medications[5]    Allergies:  Allergies[6]      ROS:       PHYSICAL EXAM:   Aquilino Lynn II  : 1970  Referring Physician: No ref. provider found     Chief Complaint   Patient presents with    Kidney Problem     Cystoscopy Stent removal           CYSTOSCOPY    Anesthesia:  2% lidocaine gel    Urethra: Normal  Prostate / Pelvic: Normal  Bladder: Normal.  No tumor, stone, diverticulum, or glomerulation  U.O's: Normal  Trabeculation: none.  Left ureteral stent removed      POST CYSTOSCOPY MEDICATIONS: sample one tablet Cipro 500mg given to patient    DIAGNOSIS:     PLAN: see below       ASSESSMENT/PLAN:   Assessment   Encounter Diagnosis   Name Primary?    Kidney stones Yes       Recommend:  - Follow-up in 6 weeks with APRN's at [facility] urology with renal ultrasound 2 to 3 days prior to the appointment.  - Call the office if he has any questions or concerns.         Orders This Visit:  No orders of the defined types were placed in this encounter.      Meds This Visit:  Requested Prescriptions      No prescriptions requested or ordered in this encounter       Imaging & Referrals:  [facility] (CPT=[id number])     2025  Stefany Rodgers MD               [1]   Past Medical History:   Cancer (HCC)    Nausea and vomiting in adult    [2]   Past Surgical History:  Procedure Laterality Date    Colonoscopy N/A 8/18/2021    Procedure: COLONOSCOPY, POSSIBLE BIOPSY, POSSIBLE POLYPECTOMY;  Surgeon: Isaac Juan DO;  Location: [facility]   [3]   Family History  Problem Relation Age of Onset    Heart Disorder Father         CHF    Cancer Father         LUNG    Other (Other) Father         SCOLIOSIS and degenerative disc disease    No Known Problems Daughter     No Known Problems Son     Cancer Maternal Grandfather         LUNG    Heart Disorder Paternal Grandmother         CHF    Cancer Paternal Grandfather     Other (Other) Sister         DEGENERATIVE DISC DISEASE    No Known Problems Sister    [4]   Social History  Socioeconomic History    Marital status:    Tobacco Use    Smoking status: Never     Passive exposure: Never    Smokeless tobacco: Never   Substance and Sexual Activity    Alcohol use: Yes     Comment: once weekly    Drug use: No     Social Drivers of Health     Food Insecurity: No Food Insecurity (4/17/2025)    NCSS - Food Insecurity     Worried About Running Out of Food in the Last Year: No     Ran Out of Food in the Last Year: No   Transportation Needs: No Transportation Needs (4/17/2025)    NCSS - Transportation     Lack of Transportation: No   Housing Stability: Not At Risk (4/17/2025)    NCSS - Housing/Utilities     Has Housing: Yes     Worried About Losing Housing: No     Unable to Get Utilities: No   [5]   Current Outpatient Medications   Medication Sig Dispense Refill    HYDROcodone-acetaminophen 5-325 MG Oral Tab Take 1-2 tablets by mouth every 6 (six) hours as needed. 12 tablet 0    Omega-3 Fatty Acids (FISH OIL OR) Take 2 capsules by mouth nightly.      MAGNESIUM OR Take 2 tablets by mouth nightly.      tamsulosin 0.4 MG Oral Cap Take 1 capsule (0.4 mg total) by mouth After dinner. Take 1/2 hour following the same meal each day 30 capsule 0   [6] No Known Allergies

## 2025-04-29 LAB
CAOX DIHYDRATE: 20 %
CAOX MONOHYDRATE: 80 %
WEIGHT-STONE: 48 MG

## 2025-05-19 ENCOUNTER — HOSPITAL ENCOUNTER (OUTPATIENT)
Dept: ULTRASOUND IMAGING | Facility: HOSPITAL | Age: 55
Discharge: HOME | End: 2025-05-19
Attending: UROLOGY
Payer: COMMERCIAL

## 2025-05-19 DIAGNOSIS — N20.0 KIDNEY STONES: ICD-10-CM

## 2025-05-19 PROCEDURE — 76775 US EXAM ABDO BACK WALL LIM: CPT | Performed by: UROLOGY

## 2025-06-02 ENCOUNTER — OFFICE VISIT (OUTPATIENT)
Dept: SURGERY | Facility: CLINIC | Age: 55
End: 2025-06-02
Payer: COMMERCIAL

## 2025-06-02 DIAGNOSIS — N20.0 KIDNEY STONES: Primary | ICD-10-CM

## 2025-06-02 PROCEDURE — 99024 POSTOP FOLLOW-UP VISIT: CPT | Performed by: PHYSICIAN ASSISTANT

## (undated) DEVICE — PACK PBDS CYSTOSCOPY

## (undated) DEVICE — BREAST-HERNIA-PORT CDS-LF: Brand: MEDLINE INDUSTRIES, INC.

## (undated) DEVICE — 20 ML SYRINGE LUER-LOCK TIP: Brand: MONOJECT

## (undated) DEVICE — SUTURE VICRYL 2-0

## (undated) DEVICE — SUTURE SILK 0 FSL

## (undated) DEVICE — NITINOL STONE RETRIEVAL BASKET: Brand: ZERO TIP

## (undated) DEVICE — LIGACLIP MCA MULTIPLE CLIP APPLIERS, 30 MEDIUM CLIPS: Brand: LIGACLIP

## (undated) DEVICE — SUTURE PROLENE 2-0 SH

## (undated) DEVICE — URETERAL ACCESS SHEATH SET: Brand: NAVIGATOR HD

## (undated) DEVICE — STERILE (15.2 X 244CM) POLYETHYLENE TELESCOPICALLY-FOLDED COVER WITH ATTACHED (3CM) NEOGUARD™ TIP: Brand: SURGI-TIP TRANSDUCER COVER

## (undated) DEVICE — VIOLET BRAIDED (POLYGLACTIN 910), SYNTHETIC ABSORBABLE SUTURE: Brand: COATED VICRYL

## (undated) DEVICE — FLEXIVA  PULSE  AND  FLEXIVA  PULSE  TRACTIP  LASER  FIBERS  ARE  HIGH  POWER  SINGLE-USE FIBER: Brand: FLEXIVA PULSE ID

## (undated) DEVICE — SYRINGE MED 10ML LL TIP W/O SFTY DISP

## (undated) DEVICE — SUTURE ETHIBOND 0 CT-1

## (undated) DEVICE — DILATOR/SHEATH SET: Brand: 8/10 DILATOR/SHEATH SET

## (undated) DEVICE — SYRINGE MED 20ML STD CLR PLAS LL TIP N CTRL

## (undated) DEVICE — GAMMEX® NON-LATEX PI TEXTURED SIZE 7.5, STERILE POLYISOPRENE POWDER-FREE SURGICAL GLOVE: Brand: GAMMEX

## (undated) DEVICE — NITINOL WIRE WITH HYDROPHILIC TIP: Brand: SENSOR

## (undated) DEVICE — SINGLE-USE DIGITAL FLEXIBLE URETEROSCOPE: Brand: LITHOVUE

## (undated) DEVICE — CATHETER URET 5FR L70CM FLX OPN TIP NONPORTED

## (undated) DEVICE — SOL  .9 1000ML BTL

## (undated) DEVICE — SUTURE VICRYL 3-0 SH

## (undated) DEVICE — SUTURE ETHILON 3-0 FSLX

## (undated) DEVICE — SINGLE ACTION PUMPING SYSTEM

## (undated) DEVICE — SUTURE VICRYL 2-0 SH

## (undated) DEVICE — GLOVE SUR 7.5 SENSICARE PI PIP CRM PWD F

## (undated) DEVICE — ADHESIVE LIQ 2/3ML VI MASTISOL

## (undated) DEVICE — KENDALL SCD EXPRESS SLEEVES, KNEE LENGTH, MEDIUM: Brand: KENDALL SCD

## (undated) DEVICE — SOLUTION IRRIG 3000ML 0.9% NACL FLX CONT

## (undated) DEVICE — SUTURE MONOCRYL 4-0 PS-2

## (undated) DEVICE — SLEEVE COMPR MD KNEE LEN SGL USE KENDALL SCD

## (undated) NOTE — LETTER
[address]  [address]  Authorization for Surgical Operation and Procedure     Date:___________                                                                                                         Time:__________  I hereby authorize Surgeon(s):  Stefany Rodgers MD, my physician and his/her assistants (if applicable), which may include medical students, residents, and/or fellows, to perform the following surgical operation/ procedure and administer such anesthesia as may be determined necessary by my physician:  Operation/Procedure name (s) Procedure(s):  CYSTOSCOPY, LEFT RETROGRADE PYELOGRAM, LEFT URETEROSCOPY, LASER LITHOTRIPSY, STONE EXTRACTION, LEFT URETERAL STENT PLACEMENT on Aquilino Ladd Lynn II   2.   I recognize that during the surgical operation/procedure, unforeseen conditions may necessitate additional or different procedures than those listed above.  I, therefore, further authorize and request that the above-named surgeon, assistants, or designees perform such procedures as are, in their judgment, necessary and desirable.    3.   My surgeon/physician has discussed prior to my surgery the potential benefits, risks and side effects of this procedure; the likelihood of achieving goals; and potential problems that might occur during recuperation.  They also discussed reasonable alternatives to the procedure, including risks, benefits, and side effects related to the alternatives and risks related to not receiving this procedure.  I have had all my questions answered and I acknowledge that no guarantee has been made as to the result that may be obtained.    4.   Should the need arise during my operation/procedure, which includes change of level of care prior to discharge, I also consent to the administration of blood and/or blood products.  Further, I understand that despite careful testing and screening of blood or blood products by collecting agencies, I may still be  subject to ill effects as a result of receiving a blood transfusion and/or blood products.  The following are some, but not all, of the potential risks that can occur: fever and allergic reactions, hemolytic reactions, transmission of diseases such as Hepatitis, AIDS and Cytomegalovirus (CMV) and fluid overload.  In the event that I wish to have an autologous transfusion of my own blood, or a directed donor transfusion, I will discuss this with my physician.  Check only if Refusing Blood or Blood Products  I understand refusal of blood or blood products as deemed necessary by my physician may have serious consequences to my condition to include possible death. I hereby assume responsibility for my refusal and release the hospital, its personnel, and my physicians from any responsibility for the consequences of my refusal.          o  Refuse      5.   I authorize the use of any specimen, organs, tissues, body parts or foreign objects that may be removed from my body during the operation/procedure for diagnosis, research or teaching purposes and their subsequent disposal by hospital authorities.  I also authorize the release of specimen test results and/or written reports to my treating physician on the hospital medical staff or other referring or consulting physicians involved in my care, at the discretion of the Pathologist or my treating physician.    6.   I consent to the photographing or videotaping of the operations or procedures to be performed, including appropriate portions of my body for medical, scientific, or educational purposes, provided my identity is not revealed by the pictures or by descriptive texts accompanying them.  If the procedure has been photographed/videotaped, the surgeon will obtain the original picture, image, videotape or CD.  The hospital will not be responsible for storage, release or maintenance of the picture, image, tape or CD.    7.   I consent to the presence of a product  specialist or observers in the operating room as deemed necessary by my physician or their designees.    8.   I recognize that in the event my procedure results in extended X-Ray/fluoroscopy time, I may develop a skin reaction.    9. If I have a Do Not Attempt Resuscitation (DNAR) order in place, that status will be suspended while in the operating room, procedural suite, and during the recovery period unless otherwise explicitly stated by me (or a person authorized to consent on my behalf). The surgeon or my attending physician will determine when the applicable recovery period ends for purposes of reinstating the DNAR order.  10. Patients having a sterilization procedure: I understand that if the procedure is successful the results will be permanent and it will therefore be impossible for me to inseminate, conceive, or bear children.  I also understand that the procedure is intended to result in sterility, although the result has not been guaranteed.   11. I acknowledge that my physician has explained sedation/analgesia administration to me including the risk and benefits I consent to the administration of sedation/analgesia as may be necessary or desirable in the judgment of my physician.    I CERTIFY THAT I HAVE READ AND FULLY UNDERSTAND THE ABOVE CONSENT TO OPERATION and/or OTHER PROCEDURE.    _________________________________________  __________________________________  Signature of Patient     Signature of Responsible Person         ___________________________________         Printed Name of Responsible Person           _________________________________                 Relationship to Patient  _________________________________________  ______________________________  Signature of Witness          Date  Time      Patient Name: Aquilino Lynn BUFFY     : 1970                 Printed: 2025     Medical Record #: [id number]                     Page 2 of 3                                    Edward  [address]  [address]    Consent for Anesthesia    I, Aquilino Lynn II agree to be cared for by an anesthesiologist, who is specially trained to monitor me and give me medicine to put me to sleep or keep me comfortable during my procedure    I understand that my anesthesiologist is not an employee or agent of [facility] or [employer]. He or she works for [employer].    As the patient asking for anesthesia services, I agree to:  Allow the anesthesiologist (anesthesia doctor) to give me medicine and do additional procedures as necessary. Some examples are: Starting or using an “IV” to give me medicine, fluids or blood during my procedure, and having a breathing tube placed to help me breathe when I’m asleep (intubation). In the event that my heart stops working properly, I understand that my anesthesiologist will make every effort to sustain my life, unless otherwise directed by [facility] Do Not Resuscitate documents.  Tell my anesthesia doctor before my procedure:  If I am pregnant.  The last time that I ate or drank.  All of the medicines I take (including prescriptions, herbal supplements, and pills I can buy without a prescription (including street drugs/illegal medications). Failure to inform my anesthesiologist about these medicines may increase my risk of anesthetic complications.  If I am allergic to anything or have had a reaction to anesthesia before.  I understand how the anesthesia medicine will help me (benefits).  I understand that with any type of anesthesia medicine there are risks:  The most common risks are: nausea, vomiting, sore throat, muscle soreness, damage to my eyes, mouth, or teeth (from breathing tube placement).  Rare risks include: remembering what happened during my procedure, allergic reactions to medications, injury to my airway, heart, lungs, vision, nerves, or muscles and in extremely rare instances  death.  My doctor has explained to me other choices available to me for my care (alternatives).  Pregnant Patients (“epidural”):  I understand that the risks of having an epidural (medicine given into my back to help control pain during labor), include itching, low blood pressure, difficulty urinating, headache or slowing of the baby’s heart. Very rare risks include infection, bleeding, seizure, irregular heart rhythms and nerve injury.  Regional Anesthesia (“spinal”, “epidural”, & “nerve blocks”):  I understand that rare but potential complications include headache, bleeding, infection, seizure, irregular heart rhythms, and nerve injury.    I can change my mind about having anesthesia services at any time before I get the medicine.    _____________________________________________________________________________  Patient (or Representative) Signature/Relationship to Patient  Date   Time    _____________________________________________________________________________   Name (if used)    Language/Organization   Time    _____________________________________________________________________________  Anesthesiologist Signature     Date   Time  I have discussed the procedure and information above with the patient (or patient’s representative) and answered their questions. The patient or their representative has agreed to have anesthesia services.    _____________________________________________________________________________  Witness        Date   Time  I have verified that the signature is that of the patient or patient’s representative, and that it was signed before the procedure  Patient Name: Aquilino Lynn BUFFY     : 1970                 Printed: 2025     Medical Record #: [id number]                     Page 3 of 3

## (undated) NOTE — Clinical Note
Arianne West saw Odette Jacob in the office today. His 2 suspicious lesions, one is at the left clavicle and the other is on his right hip. The right hip is suspicious for malignant melanoma. Both have been excised here in the office today.   Thank you Maddie Jane

## (undated) NOTE — Clinical Note
Denice Silva is status post wide radical excision of the malignant melanoma of the right groin area. His sentinel lymph node was negative. He will be seeing oncology this week. I will follow-up with him again in 3 months.   Thank you Silvia Sawyer